# Patient Record
Sex: FEMALE | Race: WHITE | Employment: FULL TIME | URBAN - METROPOLITAN AREA
[De-identification: names, ages, dates, MRNs, and addresses within clinical notes are randomized per-mention and may not be internally consistent; named-entity substitution may affect disease eponyms.]

---

## 2018-09-30 ENCOUNTER — HOSPITAL ENCOUNTER (EMERGENCY)
Age: 53
Discharge: HOME | End: 2018-09-30
Attending: EMERGENCY MEDICINE
Payer: COMMERCIAL

## 2018-09-30 VITALS
BODY MASS INDEX: 38.83 KG/M2 | RESPIRATION RATE: 16 BRPM | SYSTOLIC BLOOD PRESSURE: 160 MMHG | TEMPERATURE: 97.9 F | HEART RATE: 65 BPM | HEIGHT: 62 IN | OXYGEN SATURATION: 95 % | WEIGHT: 211 LBS | DIASTOLIC BLOOD PRESSURE: 121 MMHG

## 2018-09-30 DIAGNOSIS — G43.109 MIGRAINE WITH AURA AND WITHOUT STATUS MIGRAINOSUS, NOT INTRACTABLE: Primary | ICD-10-CM

## 2018-09-30 LAB
ANION GAP SERPL CALCULATED.3IONS-SCNC: 13 MMOL/L (ref 7–16)
BASOPHILS ABSOLUTE: 0.04 E9/L (ref 0–0.2)
BASOPHILS RELATIVE PERCENT: 0.4 % (ref 0–2)
BUN BLDV-MCNC: 13 MG/DL (ref 6–20)
C-REACTIVE PROTEIN: 2.9 MG/DL (ref 0–0.4)
CALCIUM SERPL-MCNC: 9 MG/DL (ref 8.6–10.2)
CHLORIDE BLD-SCNC: 102 MMOL/L (ref 98–107)
CO2: 26 MMOL/L (ref 22–29)
CREAT SERPL-MCNC: 0.8 MG/DL (ref 0.5–1)
EOSINOPHILS ABSOLUTE: 0.16 E9/L (ref 0.05–0.5)
EOSINOPHILS RELATIVE PERCENT: 1.6 % (ref 0–6)
GFR AFRICAN AMERICAN: >60
GFR NON-AFRICAN AMERICAN: >60 ML/MIN/1.73
GLUCOSE BLD-MCNC: 134 MG/DL (ref 74–109)
HCT VFR BLD CALC: 44.5 % (ref 34–48)
HEMOGLOBIN: 13.9 G/DL (ref 11.5–15.5)
IMMATURE GRANULOCYTES #: 0.05 E9/L
IMMATURE GRANULOCYTES %: 0.5 % (ref 0–5)
LYMPHOCYTES ABSOLUTE: 2.02 E9/L (ref 1.5–4)
LYMPHOCYTES RELATIVE PERCENT: 20.5 % (ref 20–42)
MCH RBC QN AUTO: 28.1 PG (ref 26–35)
MCHC RBC AUTO-ENTMCNC: 31.2 % (ref 32–34.5)
MCV RBC AUTO: 89.9 FL (ref 80–99.9)
MONOCYTES ABSOLUTE: 0.46 E9/L (ref 0.1–0.95)
MONOCYTES RELATIVE PERCENT: 4.7 % (ref 2–12)
NEUTROPHILS ABSOLUTE: 7.11 E9/L (ref 1.8–7.3)
NEUTROPHILS RELATIVE PERCENT: 72.3 % (ref 43–80)
PDW BLD-RTO: 13.4 FL (ref 11.5–15)
PLATELET # BLD: 298 E9/L (ref 130–450)
PMV BLD AUTO: 10.1 FL (ref 7–12)
POTASSIUM SERPL-SCNC: 4.1 MMOL/L (ref 3.5–5)
RBC # BLD: 4.95 E12/L (ref 3.5–5.5)
SEDIMENTATION RATE, ERYTHROCYTE: 17 MM/HR (ref 0–20)
SODIUM BLD-SCNC: 141 MMOL/L (ref 132–146)
WBC # BLD: 9.8 E9/L (ref 4.5–11.5)

## 2018-09-30 PROCEDURE — 96375 TX/PRO/DX INJ NEW DRUG ADDON: CPT

## 2018-09-30 PROCEDURE — 96374 THER/PROPH/DIAG INJ IV PUSH: CPT

## 2018-09-30 PROCEDURE — 6370000000 HC RX 637 (ALT 250 FOR IP): Performed by: STUDENT IN AN ORGANIZED HEALTH CARE EDUCATION/TRAINING PROGRAM

## 2018-09-30 PROCEDURE — 86140 C-REACTIVE PROTEIN: CPT

## 2018-09-30 PROCEDURE — 99284 EMERGENCY DEPT VISIT MOD MDM: CPT

## 2018-09-30 PROCEDURE — 80048 BASIC METABOLIC PNL TOTAL CA: CPT

## 2018-09-30 PROCEDURE — 6360000002 HC RX W HCPCS: Performed by: STUDENT IN AN ORGANIZED HEALTH CARE EDUCATION/TRAINING PROGRAM

## 2018-09-30 PROCEDURE — 36415 COLL VENOUS BLD VENIPUNCTURE: CPT

## 2018-09-30 PROCEDURE — 85025 COMPLETE CBC W/AUTO DIFF WBC: CPT

## 2018-09-30 PROCEDURE — 85651 RBC SED RATE NONAUTOMATED: CPT

## 2018-09-30 RX ORDER — METHYLPREDNISOLONE SODIUM SUCCINATE 125 MG/2ML
125 INJECTION, POWDER, LYOPHILIZED, FOR SOLUTION INTRAMUSCULAR; INTRAVENOUS ONCE
Status: COMPLETED | OUTPATIENT
Start: 2018-09-30 | End: 2018-09-30

## 2018-09-30 RX ORDER — KETOROLAC TROMETHAMINE 15 MG/ML
15 INJECTION, SOLUTION INTRAMUSCULAR; INTRAVENOUS ONCE
Status: COMPLETED | OUTPATIENT
Start: 2018-09-30 | End: 2018-09-30

## 2018-09-30 RX ORDER — METOCLOPRAMIDE HYDROCHLORIDE 5 MG/ML
10 INJECTION INTRAMUSCULAR; INTRAVENOUS ONCE
Status: COMPLETED | OUTPATIENT
Start: 2018-09-30 | End: 2018-09-30

## 2018-09-30 RX ORDER — DIPHENHYDRAMINE HCL 25 MG
50 TABLET ORAL ONCE
Status: COMPLETED | OUTPATIENT
Start: 2018-09-30 | End: 2018-09-30

## 2018-09-30 RX ADMIN — METHYLPREDNISOLONE SODIUM SUCCINATE 125 MG: 125 INJECTION, POWDER, FOR SOLUTION INTRAMUSCULAR; INTRAVENOUS at 05:10

## 2018-09-30 RX ADMIN — METOCLOPRAMIDE 10 MG: 5 INJECTION, SOLUTION INTRAMUSCULAR; INTRAVENOUS at 05:10

## 2018-09-30 RX ADMIN — KETOROLAC TROMETHAMINE 15 MG: 15 INJECTION, SOLUTION INTRAMUSCULAR; INTRAVENOUS at 06:00

## 2018-09-30 RX ADMIN — DIPHENHYDRAMINE HCL 50 MG: 25 TABLET ORAL at 05:10

## 2018-09-30 ASSESSMENT — ENCOUNTER SYMPTOMS
DIARRHEA: 0
CHEST TIGHTNESS: 0
VOMITING: 0
NAUSEA: 0
PHOTOPHOBIA: 0
SORE THROAT: 0
EYE REDNESS: 0
BLOOD IN STOOL: 0
ABDOMINAL DISTENTION: 0
CONSTIPATION: 0
WHEEZING: 0
RHINORRHEA: 0
BACK PAIN: 0
SHORTNESS OF BREATH: 0
SINUS PRESSURE: 0
EYE PAIN: 0
ABDOMINAL PAIN: 0
TROUBLE SWALLOWING: 0
COUGH: 0

## 2018-09-30 ASSESSMENT — PAIN SCALES - GENERAL
PAINLEVEL_OUTOF10: 5
PAINLEVEL_OUTOF10: 10

## 2018-09-30 ASSESSMENT — PAIN DESCRIPTION - FREQUENCY: FREQUENCY: CONTINUOUS

## 2018-09-30 ASSESSMENT — PAIN DESCRIPTION - DESCRIPTORS: DESCRIPTORS: THROBBING

## 2018-09-30 ASSESSMENT — PAIN DESCRIPTION - PROGRESSION: CLINICAL_PROGRESSION: NOT CHANGED

## 2018-09-30 ASSESSMENT — PAIN DESCRIPTION - LOCATION: LOCATION: HEAD

## 2018-09-30 ASSESSMENT — PAIN DESCRIPTION - ONSET: ONSET: ON-GOING

## 2018-09-30 ASSESSMENT — PAIN DESCRIPTION - ORIENTATION: ORIENTATION: RIGHT

## 2018-09-30 ASSESSMENT — PAIN DESCRIPTION - PAIN TYPE: TYPE: ACUTE PAIN

## 2018-09-30 NOTE — ED PROVIDER NOTES
ED ATTENDING NOTE    I saw and examined the patient. I discussed the history and examination with the resident and agree with the plan of care         HPI: Pt presents with HA, started 1.5 hours ago, blurry vision in right eye, pmh of migraines, pt denies f/c, cp, sob, cough, ap, n/v/d. Pt is lying in bed in no acute distress. --------------------------------------------- PAST HISTORY ---------------------------------------------  Past Medical History:  has a past medical history of Goiter; Hypertension; and Thyroid disease. Past Surgical History:  has a past surgical history that includes Appendectomy;  section; Tubal ligation; Breast lumpectomy; and Total Thyroidectomy (12). Social History:  reports that she has been smoking Cigarettes. She has a 20.00 pack-year smoking history. She has never used smokeless tobacco. She reports that she does not drink alcohol or use drugs. Family History: family history is not on file. The patients home medications have been reviewed.     Allergies: Vicodin [hydrocodone-acetaminophen]    ROS: Review HPI for other details  Details in electronic record may supersede details below    Gen: no chills, fever  Eyes: no discharge, no change vision  ENT: no sore throat, no rhinitis  Cardiac: no chest pain, no palpitations  Resp: no sob, no cough  GI: no abd pain, no nausea, vomiting, or diarrhea  : no dysuria, urgency, change in color  MS: no back pain, joint pain, no falls, no trauma   Skin: no rash, no itch  Neuro: no dizziness, (+) headache, no focal paralysis or parasthesia  Psych: no hallucinations, no depression  Heme: no bruising, no bleeding  Other relevant systems reviewed and negative    Physical brief exam: See HPI for other details  Details in electronic record may supersede details below    Vital signs reviewed, please refer to nurses note  Constitutionall: No distress, warm, dry, well nourished, nontoxic  HENT:  NC AT, no deformity, no

## 2018-09-30 NOTE — ED NOTES
Pt presents to ED with c/o headache x 2 hours. Pt reports being woken from sleep with HA. Pt reports pain behind Rt eye. Pt denies vision changes. Pt endorses photo/audio-sensitivity. Pt reports Rt arm tingling. Pt denies numbness/weakness in any extremity. Pt denies CP and SOB. Pt denies [address]. Pt is A&O x 3. Pt placed on cardiac monitor and continuous pulse oximetry. Call light within reach. Bed In lowest position. Both side-rails up. NAD noted. Will continue to monitor.           Sergei Johnson RN  09/30/18 [id number]

## 2021-07-30 ENCOUNTER — HOSPITAL ENCOUNTER (EMERGENCY)
Age: 56
Discharge: HOME | End: 2021-07-30
Attending: EMERGENCY MEDICINE

## 2021-07-30 ENCOUNTER — APPOINTMENT (OUTPATIENT)
Dept: CT IMAGING | Age: 56
End: 2021-07-30

## 2021-07-30 VITALS
WEIGHT: 168 LBS | SYSTOLIC BLOOD PRESSURE: 193 MMHG | DIASTOLIC BLOOD PRESSURE: 110 MMHG | TEMPERATURE: 97.3 F | HEIGHT: 62 IN | BODY MASS INDEX: 30.91 KG/M2 | HEART RATE: 80 BPM

## 2021-07-30 DIAGNOSIS — S05.11XA ORBITAL CONTUSION, RIGHT, INITIAL ENCOUNTER: Primary | ICD-10-CM

## 2021-07-30 PROCEDURE — 70486 CT MAXILLOFACIAL W/O DYE: CPT

## 2021-07-30 PROCEDURE — 70450 CT HEAD/BRAIN W/O DYE: CPT

## 2021-07-30 PROCEDURE — 99284 EMERGENCY DEPT VISIT MOD MDM: CPT

## 2021-07-30 ASSESSMENT — ENCOUNTER SYMPTOMS
VOMITING: 0
NAUSEA: 0
SHORTNESS OF BREATH: 0
EYE REDNESS: 0
ABDOMINAL PAIN: 0
FACIAL SWELLING: 1

## 2021-07-30 ASSESSMENT — PAIN SCALES - GENERAL: PAINLEVEL_OUTOF10: 7

## 2021-07-30 NOTE — ED NOTES
Patient refused visual acuity at this time, states she wants to see the doctor first.      Michael Ocampo RN  07/30/21 [phone]

## 2021-07-30 NOTE — ED NOTES
Ice placed, this happened last night, was struck right temporal/eye area, no loc, no nausea     Brandon Vigil RN  07/30/21 [id number]

## 2021-07-30 NOTE — ED PROVIDER NOTES
Chief complaint: Swelling      HPI:  21, Time: 12:59 PM EDT    HPI               Daniel Brandon is a 60 y.o. female presenting to the ED for eye swelling. The history is obtained from the patient as well as the patient's medical record. The patient is presenting today for right eye swelling. The patient was struck with a foul ball describe her skin last night. Patient did have sudden onset of pain. Is described stabbing, nonradiating. Located on the right eye. Nothing makes it better. Nothing makes it worse. No treatment prior to arrival.  This been constant since onset. The patient was evaluated at the EMTs of this crepitus came but did not receive any treatments. The patient states she woke up this morning and her eye was swollen shut. The patient states that when she was able to see her eye there was no hyphema. She states that her pupil and iris were unremarkable. She was able to see out of her eye normally before it was swollen shut. ROS:   Review of Systems   Constitutional: Negative for chills and fatigue. HENT: Positive for facial swelling. Negative for congestion. Eyes: Negative for redness. Respiratory: Negative for shortness of breath. Cardiovascular: Negative for chest pain. Gastrointestinal: Negative for abdominal pain, nausea and vomiting. Genitourinary: Negative for dysuria. Musculoskeletal: Negative for arthralgias. Skin: Negative for rash. Neurological: Negative for light-headedness. Psychiatric/Behavioral: Negative for confusion. All other systems reviewed and are negative.      --------------------------------------------- PAST HISTORY ---------------------------------------------  Past Medical History:  has a past medical history of Goiter, Hypertension, and Thyroid disease. Past Surgical History:  has a past surgical history that includes Appendectomy;   section; Tubal ligation; Breast lumpectomy; and Total Thyroidectomy (6-21-12). Social History:  reports that she has been smoking cigarettes. She has a 20.00 pack-year smoking history. She has never used smokeless tobacco. She reports that she does not drink alcohol and does not use drugs. Family History: family history is not on file. The patients home medications have been reviewed. Allergies: Vicodin [hydrocodone-acetaminophen]    ---------------------------------------------------PHYSICAL EXAM--------------------------------------  Constitutional/General: Alert and oriented x3, well appearing, non toxic in NAD  Head: Normocephalic and there is right periorbital bruising and ecchymosis present, I is not able to be pried open and evaluated, there is right periorbital edema  Mouth: Oropharynx clear, handling secretions, no trismus  Neck: Supple, full ROM,  Pulmonary: Lungs clear to auscultation bilaterally, no wheezes, rales, or rhonchi. Not in respiratory distress  Cardiovascular:  Regular rate. Regular rhythm. No murmurs  Chest: no chest wall tenderness  Abdomen: Soft. Non tender. Non distended. No rebound, guarding, or rigidity. No pulsatile masses appreciated. Musculoskeletal: Moves all extremities x 4. Warm and well perfused, no clubbing, cyanosis, or edema. Capillary refill <3 seconds  Skin: warm and dry. No rashes. Neurologic: GCS 15, no gross focal neurologic deficits  Psych: Normal Affect    -------------------------------------------------- RESULTS -------------------------------------------------  I have personally reviewed all laboratory and imaging results for this patient. Results are listed below. LABS:  No results found for this visit on 07/30/21. RADIOLOGY:  Interpreted by Radiologist.  [facility]   Final Result   1. No sign of facial fracture. 2. Moderate-marked right periorbital, buccal and Perimandibular  soft tissue   contusion. CT HEAD WO CONTRAST   Final Result   1. No acute intracranial abnormality. There is no acute intracranial   hemorrhage. .      2.  Significant right periorbital soft tissue swelling      3. Suspected nondisplaced fracture of the right zygomatic arch. Please see   the dedicated CT of the maxillofacial region report.                 ------------------------- NURSING NOTES AND VITALS REVIEWED ---------------------------   The nursing notes within the ED encounter and vital signs as below have been reviewed by myself. BP (!) 193/110   Pulse 80   Temp 97.3 °F (36.3 °C) (Infrared)   Ht 5' 2\" (1.575 m)   Wt 168 lb (76.2 kg)   LMP 11/18/2011   BMI 30.73 kg/m²   Oxygen Saturation Interpretation: Normal    The patients available past medical records and past encounters were reviewed. ------------------------------ ED COURSE/MEDICAL DECISION MAKING----------------------  Medications - No data to display          Medical Decision Making:   I, Dr. Vanita Camacho am the primary physician of record. Óscar Pleitez is a 60 y.o. female who presents to the ED for pain. I was unable to open the patient's eye secondary to the profound periorbital edema. The patient did have imaging which was negative for any fracture. Differential diagnosis includes but not limited to, fracture, contusion, intracranial hemorrhage. The patient will be diagnosed with contusion will follow-up outpatient. Re-Evaluations/Consultations:           Patient is in no acute distress. Results of today discussed. The patient be discharged. This patient's ED course included: History, physical examination, reevaluation prior to disposition, labs,       This patient has remained hemodynamically stable during their ED course. Counseling: The emergency provider has spoken with the  and discussed todays results, in addition to providing specific details for the plan of care and counseling regarding the diagnosis and prognosis.   Questionpatients are answered at this time and they are agreeable with the plan.       --------------------------------- IMPRESSION AND DISPOSITION ---------------------------------    IMPRESSION  1. Orbital contusion, right, initial encounter        DISPOSITION  Disposition: Discharge to home  Patient condition is stable        NOTE: This report was transcribed using voice recognition software.  Every effort was made to ensure accuracy; however, inadvertent computerized transcription errors may be present         Petey Ford DO  07/30/21 1504

## 2022-03-13 ENCOUNTER — APPOINTMENT (OUTPATIENT)
Dept: MRI IMAGING | Age: 57
DRG: 065 | End: 2022-03-13

## 2022-03-13 ENCOUNTER — APPOINTMENT (OUTPATIENT)
Dept: ULTRASOUND IMAGING | Age: 57
DRG: 065 | End: 2022-03-13

## 2022-03-13 ENCOUNTER — APPOINTMENT (OUTPATIENT)
Dept: CT IMAGING | Age: 57
DRG: 065 | End: 2022-03-13

## 2022-03-13 ENCOUNTER — APPOINTMENT (OUTPATIENT)
Dept: GENERAL RADIOLOGY | Age: 57
DRG: 065 | End: 2022-03-13

## 2022-03-13 ENCOUNTER — HOSPITAL ENCOUNTER (INPATIENT)
Age: 57
LOS: 1 days | Discharge: HOME | DRG: 065 | End: 2022-03-14
Attending: EMERGENCY MEDICINE | Admitting: INTERNAL MEDICINE

## 2022-03-13 DIAGNOSIS — R29.90 STROKE-LIKE SYMPTOMS: Primary | ICD-10-CM

## 2022-03-13 PROBLEM — I63.9 ACUTE CVA (CEREBROVASCULAR ACCIDENT) (HCC): Status: ACTIVE | Noted: 2022-03-13

## 2022-03-13 LAB
ACETAMINOPHEN LEVEL: <5 MCG/ML (ref 10–30)
ALBUMIN SERPL-MCNC: 4.1 G/DL (ref 3.5–5.2)
ALP BLD-CCNC: 102 U/L (ref 35–104)
ALT SERPL-CCNC: 20 U/L (ref 0–32)
AMPHETAMINE SCREEN, URINE: NOT DETECTED
ANION GAP SERPL CALCULATED.3IONS-SCNC: 13 MMOL/L (ref 7–16)
AST SERPL-CCNC: 17 U/L (ref 0–31)
B.E.: -0.8 MMOL/L (ref -3–3)
BACTERIA: ABNORMAL /HPF
BARBITURATE SCREEN URINE: NOT DETECTED
BASOPHILS ABSOLUTE: 0.05 E9/L (ref 0–0.2)
BASOPHILS RELATIVE PERCENT: 0.6 % (ref 0–2)
BENZODIAZEPINE SCREEN, URINE: NOT DETECTED
BILIRUB SERPL-MCNC: 0.3 MG/DL (ref 0–1.2)
BILIRUBIN URINE: NEGATIVE
BLOOD, URINE: NEGATIVE
BUN BLDV-MCNC: 10 MG/DL (ref 6–20)
CALCIUM SERPL-MCNC: 8.8 MG/DL (ref 8.6–10.2)
CANNABINOID SCREEN URINE: NOT DETECTED
CHLORIDE BLD-SCNC: 103 MMOL/L (ref 98–107)
CLARITY: CLEAR
CO2: 25 MMOL/L (ref 22–29)
COCAINE METABOLITE SCREEN URINE: NOT DETECTED
COHB: 1.2 % (ref 0–1.5)
COLOR: YELLOW
CREAT SERPL-MCNC: 0.7 MG/DL (ref 0.5–1)
CRITICAL: ABNORMAL
D DIMER: 440 NG/ML DDU
DATE ANALYZED: ABNORMAL
DATE OF COLLECTION: ABNORMAL
EKG ATRIAL RATE: 79 BPM
EKG P AXIS: 57 DEGREES
EKG P-R INTERVAL: 136 MS
EKG Q-T INTERVAL: 412 MS
EKG QRS DURATION: 82 MS
EKG QTC CALCULATION (BAZETT): 472 MS
EKG R AXIS: 8 DEGREES
EKG T AXIS: 49 DEGREES
EKG VENTRICULAR RATE: 79 BPM
EOSINOPHILS ABSOLUTE: 0.14 E9/L (ref 0.05–0.5)
EOSINOPHILS RELATIVE PERCENT: 1.6 % (ref 0–6)
EPITHELIAL CELLS, UA: ABNORMAL /HPF
ETHANOL: <10 MG/DL (ref 0–0.08)
FENTANYL SCREEN, URINE: NOT DETECTED
GFR AFRICAN AMERICAN: >60
GFR NON-AFRICAN AMERICAN: >60 ML/MIN/1.73
GLUCOSE BLD-MCNC: 115 MG/DL (ref 74–99)
GLUCOSE URINE: NEGATIVE MG/DL
HCO3: 22.7 MMOL/L (ref 22–26)
HCT VFR BLD CALC: 43.2 % (ref 34–48)
HEMOGLOBIN: 13.7 G/DL (ref 11.5–15.5)
HHB: 3.5 % (ref 0–5)
IMMATURE GRANULOCYTES #: 0.03 E9/L
IMMATURE GRANULOCYTES %: 0.3 % (ref 0–5)
KETONES, URINE: NEGATIVE MG/DL
LAB: ABNORMAL
LEUKOCYTE ESTERASE, URINE: NEGATIVE
LYMPHOCYTES ABSOLUTE: 2.14 E9/L (ref 1.5–4)
LYMPHOCYTES RELATIVE PERCENT: 24.3 % (ref 20–42)
Lab: ABNORMAL
Lab: NORMAL
MCH RBC QN AUTO: 27.6 PG (ref 26–35)
MCHC RBC AUTO-ENTMCNC: 31.7 % (ref 32–34.5)
MCV RBC AUTO: 87.1 FL (ref 80–99.9)
METER GLUCOSE: 104 MG/DL (ref 74–99)
METHADONE SCREEN, URINE: NOT DETECTED
METHB: 0.3 % (ref 0–1.5)
MODE: ABNORMAL
MONOCYTES ABSOLUTE: 0.47 E9/L (ref 0.1–0.95)
MONOCYTES RELATIVE PERCENT: 5.3 % (ref 2–12)
NEUTROPHILS ABSOLUTE: 5.98 E9/L (ref 1.8–7.3)
NEUTROPHILS RELATIVE PERCENT: 67.9 % (ref 43–80)
NITRITE, URINE: NEGATIVE
O2 CONTENT: 19 ML/DL
O2 SATURATION: 96.4 % (ref 92–98.5)
O2HB: 95 % (ref 94–97)
OPERATOR ID: 3
OPIATE SCREEN URINE: NOT DETECTED
OXYCODONE URINE: NOT DETECTED
PATIENT TEMP: 37 C
PCO2: 34.1 MMHG (ref 35–45)
PDW BLD-RTO: 14.9 FL (ref 11.5–15)
PH BLOOD GAS: 7.44 (ref 7.35–7.45)
PH UA: 6 (ref 5–9)
PHENCYCLIDINE SCREEN URINE: NOT DETECTED
PLATELET # BLD: 257 E9/L (ref 130–450)
PMV BLD AUTO: 10 FL (ref 7–12)
PO2: 80.8 MMHG (ref 75–100)
POTASSIUM REFLEX MAGNESIUM: 3.7 MMOL/L (ref 3.5–5)
PROTEIN UA: NEGATIVE MG/DL
RBC # BLD: 4.96 E12/L (ref 3.5–5.5)
RBC UA: ABNORMAL /HPF (ref 0–2)
SALICYLATE, SERUM: <0.3 MG/DL (ref 0–30)
SODIUM BLD-SCNC: 141 MMOL/L (ref 132–146)
SOURCE, BLOOD GAS: ABNORMAL
SPECIFIC GRAVITY UA: <=1.005 (ref 1–1.03)
THB: 14.2 G/DL (ref 11.5–16.5)
TIME ANALYZED: 1135
TOTAL PROTEIN: 7.6 G/DL (ref 6.4–8.3)
TRICYCLIC ANTIDEPRESSANTS SCREEN SERUM: NEGATIVE NG/ML
TROPONIN, HIGH SENSITIVITY: 11 NG/L (ref 0–9)
TSH SERPL DL<=0.05 MIU/L-ACNC: 2.9 UIU/ML (ref 0.27–4.2)
UROBILINOGEN, URINE: 0.2 E.U./DL
WBC # BLD: 8.8 E9/L (ref 4.5–11.5)
WBC UA: ABNORMAL /HPF (ref 0–5)

## 2022-03-13 PROCEDURE — 70498 CT ANGIOGRAPHY NECK: CPT

## 2022-03-13 PROCEDURE — 6360000002 HC RX W HCPCS: Performed by: INTERNAL MEDICINE

## 2022-03-13 PROCEDURE — 80179 DRUG ASSAY SALICYLATE: CPT

## 2022-03-13 PROCEDURE — 85378 FIBRIN DEGRADE SEMIQUANT: CPT

## 2022-03-13 PROCEDURE — 6360000004 HC RX CONTRAST MEDICATION: Performed by: RADIOLOGY

## 2022-03-13 PROCEDURE — 84443 ASSAY THYROID STIM HORMONE: CPT

## 2022-03-13 PROCEDURE — 85025 COMPLETE CBC W/AUTO DIFF WBC: CPT

## 2022-03-13 PROCEDURE — 0042T CT BRAIN PERFUSION: CPT

## 2022-03-13 PROCEDURE — 81001 URINALYSIS AUTO W/SCOPE: CPT

## 2022-03-13 PROCEDURE — 99284 EMERGENCY DEPT VISIT MOD MDM: CPT

## 2022-03-13 PROCEDURE — 6370000000 HC RX 637 (ALT 250 FOR IP): Performed by: EMERGENCY MEDICINE

## 2022-03-13 PROCEDURE — 93005 ELECTROCARDIOGRAM TRACING: CPT | Performed by: EMERGENCY MEDICINE

## 2022-03-13 PROCEDURE — 80143 DRUG ASSAY ACETAMINOPHEN: CPT

## 2022-03-13 PROCEDURE — 84484 ASSAY OF TROPONIN QUANT: CPT

## 2022-03-13 PROCEDURE — 93010 ELECTROCARDIOGRAM REPORT: CPT | Performed by: INTERNAL MEDICINE

## 2022-03-13 PROCEDURE — 93880 EXTRACRANIAL BILAT STUDY: CPT

## 2022-03-13 PROCEDURE — 80307 DRUG TEST PRSMV CHEM ANLYZR: CPT

## 2022-03-13 PROCEDURE — 82077 ASSAY SPEC XCP UR&BREATH IA: CPT

## 2022-03-13 PROCEDURE — 99443 PR PHYS/QHP TELEPHONE EVALUATION 21-30 MIN: CPT | Performed by: PSYCHIATRY & NEUROLOGY

## 2022-03-13 PROCEDURE — 36415 COLL VENOUS BLD VENIPUNCTURE: CPT

## 2022-03-13 PROCEDURE — 2580000003 HC RX 258: Performed by: EMERGENCY MEDICINE

## 2022-03-13 PROCEDURE — 2500000003 HC RX 250 WO HCPCS: Performed by: INTERNAL MEDICINE

## 2022-03-13 PROCEDURE — 70450 CT HEAD/BRAIN W/O DYE: CPT

## 2022-03-13 PROCEDURE — 97161 PT EVAL LOW COMPLEX 20 MIN: CPT | Performed by: PHYSICAL THERAPIST

## 2022-03-13 PROCEDURE — 71045 X-RAY EXAM CHEST 1 VIEW: CPT

## 2022-03-13 PROCEDURE — 1200000000 HC SEMI PRIVATE

## 2022-03-13 PROCEDURE — 82805 BLOOD GASES W/O2 SATURATION: CPT

## 2022-03-13 PROCEDURE — 70496 CT ANGIOGRAPHY HEAD: CPT

## 2022-03-13 PROCEDURE — 6370000000 HC RX 637 (ALT 250 FOR IP): Performed by: INTERNAL MEDICINE

## 2022-03-13 PROCEDURE — 82962 GLUCOSE BLOOD TEST: CPT

## 2022-03-13 PROCEDURE — 80053 COMPREHEN METABOLIC PANEL: CPT

## 2022-03-13 PROCEDURE — 70551 MRI BRAIN STEM W/O DYE: CPT

## 2022-03-13 RX ORDER — CLOPIDOGREL BISULFATE 75 MG/1
75 TABLET ORAL DAILY
Status: DISCONTINUED | OUTPATIENT
Start: 2022-03-13 | End: 2022-03-14 | Stop reason: HOSPADM

## 2022-03-13 RX ORDER — POLYETHYLENE GLYCOL 3350 17 G/17G
17 POWDER, FOR SOLUTION ORAL DAILY PRN
Status: DISCONTINUED | OUTPATIENT
Start: 2022-03-13 | End: 2022-03-14 | Stop reason: HOSPADM

## 2022-03-13 RX ORDER — CLOPIDOGREL 300 MG/1
300 TABLET, FILM COATED ORAL ONCE
Status: COMPLETED | OUTPATIENT
Start: 2022-03-13 | End: 2022-03-13

## 2022-03-13 RX ORDER — ONDANSETRON 4 MG/1
4 TABLET, ORALLY DISINTEGRATING ORAL EVERY 8 HOURS PRN
Status: DISCONTINUED | OUTPATIENT
Start: 2022-03-13 | End: 2022-03-14 | Stop reason: HOSPADM

## 2022-03-13 RX ORDER — MAGNESIUM SULFATE 1 G/100ML
1000 INJECTION INTRAVENOUS PRN
Status: DISCONTINUED | OUTPATIENT
Start: 2022-03-13 | End: 2022-03-14 | Stop reason: HOSPADM

## 2022-03-13 RX ORDER — 0.9 % SODIUM CHLORIDE 0.9 %
1000 INTRAVENOUS SOLUTION INTRAVENOUS ONCE
Status: DISCONTINUED | OUTPATIENT
Start: 2022-03-13 | End: 2022-03-13

## 2022-03-13 RX ORDER — LABETALOL HYDROCHLORIDE 5 MG/ML
10 INJECTION, SOLUTION INTRAVENOUS EVERY 10 MIN PRN
Status: DISCONTINUED | OUTPATIENT
Start: 2022-03-13 | End: 2022-03-14

## 2022-03-13 RX ORDER — LEVOTHYROXINE SODIUM 0.05 MG/1
50 TABLET ORAL DAILY
Status: DISCONTINUED | OUTPATIENT
Start: 2022-03-13 | End: 2022-03-14 | Stop reason: HOSPADM

## 2022-03-13 RX ORDER — POTASSIUM CHLORIDE 7.45 MG/ML
10 INJECTION INTRAVENOUS PRN
Status: DISCONTINUED | OUTPATIENT
Start: 2022-03-13 | End: 2022-03-14 | Stop reason: HOSPADM

## 2022-03-13 RX ORDER — LISINOPRIL 10 MG/1
10 TABLET ORAL DAILY
Status: DISCONTINUED | OUTPATIENT
Start: 2022-03-13 | End: 2022-03-14 | Stop reason: HOSPADM

## 2022-03-13 RX ORDER — POTASSIUM CHLORIDE 20 MEQ/1
40 TABLET, EXTENDED RELEASE ORAL PRN
Status: DISCONTINUED | OUTPATIENT
Start: 2022-03-13 | End: 2022-03-14 | Stop reason: HOSPADM

## 2022-03-13 RX ORDER — ASPIRIN 81 MG/1
324 TABLET, CHEWABLE ORAL ONCE
Status: COMPLETED | OUTPATIENT
Start: 2022-03-13 | End: 2022-03-13

## 2022-03-13 RX ORDER — ATORVASTATIN CALCIUM 40 MG/1
80 TABLET, FILM COATED ORAL NIGHTLY
Status: DISCONTINUED | OUTPATIENT
Start: 2022-03-13 | End: 2022-03-14 | Stop reason: HOSPADM

## 2022-03-13 RX ORDER — ONDANSETRON 2 MG/ML
4 INJECTION INTRAMUSCULAR; INTRAVENOUS EVERY 6 HOURS PRN
Status: DISCONTINUED | OUTPATIENT
Start: 2022-03-13 | End: 2022-03-14 | Stop reason: HOSPADM

## 2022-03-13 RX ORDER — 0.9 % SODIUM CHLORIDE 0.9 %
1000 INTRAVENOUS SOLUTION INTRAVENOUS ONCE
Status: COMPLETED | OUTPATIENT
Start: 2022-03-13 | End: 2022-03-13

## 2022-03-13 RX ADMIN — ASPIRIN 81 MG 324 MG: 81 TABLET ORAL at 15:34

## 2022-03-13 RX ADMIN — CLOPIDOGREL BISULFATE 300 MG: 300 TABLET, FILM COATED ORAL at 17:17

## 2022-03-13 RX ADMIN — IOPAMIDOL 140 ML: 755 INJECTION, SOLUTION INTRAVENOUS at 12:34

## 2022-03-13 RX ADMIN — ENOXAPARIN SODIUM 40 MG: 100 INJECTION SUBCUTANEOUS at 15:34

## 2022-03-13 RX ADMIN — ATORVASTATIN CALCIUM 80 MG: 40 TABLET, FILM COATED ORAL at 21:31

## 2022-03-13 RX ADMIN — LABETALOL HYDROCHLORIDE 10 MG: 5 INJECTION INTRAVENOUS at 15:36

## 2022-03-13 RX ADMIN — SODIUM CHLORIDE 1000 ML: 9 INJECTION, SOLUTION INTRAVENOUS at 11:37

## 2022-03-13 ASSESSMENT — ENCOUNTER SYMPTOMS
BACK PAIN: 0
SHORTNESS OF BREATH: 0
WHEEZING: 0
DIARRHEA: 0
ABDOMINAL PAIN: 0
EYE PAIN: 0
NAUSEA: 0
VOMITING: 0
COUGH: 0
SORE THROAT: 0

## 2022-03-13 ASSESSMENT — PAIN SCALES - GENERAL
PAINLEVEL_OUTOF10: 0
PAINLEVEL_OUTOF10: 0

## 2022-03-13 NOTE — H&P
Department of Internal Medicine  History and Physical Examination     Primary Care Physician: Ike Guillen DO   Admitting Physician:  Maame Batres DO  Admission date and time: 3/13/2022 10:58 AM    Room:  06/06  Admitting diagnosis: Acute CVA (cerebrovascular accident) [facility]) [I63.9]    Patient Name: Ty Vila  MRN: [id number]    Date of Service: 3/13/2022     Chief Complaint: Altered mental status and slurred speech    HISTORY OF PRESENT ILLNESS:    Cinthia 40-year-old female patient who presented to [address] [place] [address] emergency department concern of stroke. She developed some slurred speech, altered mentation and weakness. Onset of symptoms was between 8 and 8:30 AM.  ER presentation revealed resolution of symptoms upon arrival.  NIH at that time scored 0. CT scan showed remote appearing infarct but nothing acute. She was markedly hypertensive on presentation as well. Around noon, the patient's symptoms recurred and she was found to be significantly less hypertensive. CTAs were ordered and have been performed but are not yet resulted. Extensive work-up was undertaken including urinalysis with urine drug screen returned negative, serum drug screen and alcohol that returned negative, CBC that was unremarkable, D-dimer that was only mildly elevated at 440, developmental that revealed very mild hyperglycemia but was otherwise unremarkable. Hepatic function panel was also assessed and negative. TSH was normal.  High-sensitivity troponin was not significantly elevated. Case was discussed with the internal medicine physician and the ER physician, all parties are agreeable with plan for admission at this time. Patient was seen and assessed at bedside following CTA completion. She is again having some slurred speech and dysarthria as well as left upper extremity weakness. She denies headache. She denies any known history of stroke. She denies any recent injury, falls or trauma.   She denies any use of intoxicating substances or illicit drugs. She denies any additional neurological complaints. She denies chest pain or palpitations. She denies shortness of breath, cough or URI complaints. She denies abdominal pain, nausea vomiting, bowel changes, hematochezia or melena. Denies acute urinary complaints. I have discussed the case with the ER physician resident as they have updated the neurologist involved in the case. Recommendations were for permissive hypertension as the patient's change in status was associated with a significant decrease in blood pressure. Stat MRI has been arranged and the patient is for MRI at this immediate time. PAST MEDICAL Hx:  Past Medical History:   Diagnosis Date    Goiter     Hypertension     Thyroid disease        PAST SURGICAL Hx:   Past Surgical History:   Procedure Laterality Date    APPENDECTOMY      BREAST LUMPECTOMY      right,benign     SECTION      TOTAL THYROIDECTOMY  10    sub total thyroidectomy    TUBAL LIGATION         FAMILY Hx:  History reviewed. No pertinent family history. HOME MEDICATIONS:  Prior to Admission medications    Medication Sig Start Date End Date Taking? Authorizing Provider   lisinopril (PRINIVIL;ZESTRIL) 10 MG tablet Take 10 mg by mouth daily    Historical Provider, MD   Ascorbic Acid (VITAMIN C) 500 MG tablet Take 500 mg by mouth daily    Historical Provider, MD   vitamin D (CHOLECALCIFEROL) 1000 UNIT TABS tablet Take 1,000 Units by mouth daily    Historical Provider, MD   vitamin B-12 (CYANOCOBALAMIN) 100 MCG tablet Take 50 mcg by mouth daily    Historical Provider, MD   meclizine (ANTIVERT) 12.5 MG tablet Take 1-2 PO Q 8 hrs PRN dizziness. 3/18/16   Abundio Mustafa DO   ibuprofen (ADVIL;MOTRIN) 600 MG tablet Take 1 tablet by mouth every 6 hours as needed for Pain Take WITH Food / Meals.  3/18/16   Abundio Mustafa DO   levothyroxine (SYNTHROID) 50 MCG tablet Take 50 mcg by mouth Daily    Historical Provider, MD metoprolol (LOPRESSOR) 25 MG tablet Take 25 mg by mouth 2 times daily. Historical Provider, MD       ALLERGIES:  Vicodin [hydrocodone-acetaminophen]    SOCIAL Hx:  Social History     Socioeconomic History    Marital status:      Spouse name: Not on file    Number of children: Not on file    Years of education: Not on file    Highest education level: Not on file   Occupational History    Not on file   Tobacco Use    Smoking status: Current Every Day Smoker     Packs/day: 1.00     Years: 20.00     Pack years: 20.00     Types: Cigarettes    Smokeless tobacco: Never Used   Substance and Sexual Activity    Alcohol use: No    Drug use: No    Sexual activity: Not on file   Other Topics Concern    Not on file   Social History Narrative    Not on file     Social Determinants of Health     Financial Resource Strain:     Difficulty of Paying Living Expenses: Not on file   Food Insecurity:     Worried About Running Out of Food in the Last Year: Not on file    Wilfredo of Food in the Last Year: Not on file   Transportation Needs:     Lack of Transportation (Medical): Not on file    Lack of Transportation (Non-Medical):  Not on file   Physical Activity:     Days of Exercise per Week: Not on file    Minutes of Exercise per Session: Not on file   Stress:     Feeling of Stress : Not on file   Social Connections:     Frequency of Communication with Friends and Family: Not on file    Frequency of Social Gatherings with Friends and Family: Not on file    Attends Church Services: Not on file    Active Member of Clubs or Organizations: Not on file    Attends Club or Organization Meetings: Not on file    Marital Status: Not on file   Intimate Partner Violence:     Fear of Current or Ex-Partner: Not on file    Emotionally Abused: Not on file    Physically Abused: Not on file    Sexually Abused: Not on file   Housing Stability:     Unable to Pay for Housing in the Last Year: Not on file    Number of Places Lived in the Last Year: Not on file    Unstable Housing in the Last Year: Not on file       ROS: 12 point review of symptoms was conducted, pertinent positives and negative were reviewed, aside from that all 12 systems were reviewed and negative. PHYSICAL EXAM:  VITALS:  Vitals:    03/13/22 1150   BP: (!) 167/104   Pulse: 84   Resp: 30   Temp:    SpO2: 98%         CONSTITUTIONAL:    Awake, alert, cooperative, comfortable without distress    EYES:    PERRL, EOMI, sclera clear without icterus, conjunctiva normal    ENT:    Normocephalic, atraumatic, sinuses nontender on palpation. External ears without lesions. Oral pharynx with moist mucus membranes. NECK:    Supple, symmetrical, trachea midline, no adenopathy, thyroid symmetric, not enlarged and no tenderness, skin normal, no bruits, no JVD    HEMATOLOGIC/LYMPHATICS:    No cervical lymphadenopathy and no supraclavicular lymphadenopathy    LUNGS:    Symmetric. No increased work of breathing, diminished air exchange, clear to auscultation bilaterally, no wheezes, rhonchi, or rales,     CARDIOVASCULAR:    Normal apical impulse, regular rate and rhythm, normal S1 and S2, systolic murmur noted    ABDOMEN:    Mildly obese contour, normal bowel sounds, soft, non-distended, non-tender, no masses palpated, no hepatosplenomegaly, no rebound or guarding elicited on palpation     MUSCULOSKELETAL:    There is no redness, warmth, or swelling of the joints. Tone is normal.    NEUROLOGIC:    Awake, alert, oriented to name, place and time. Dysarthria is appreciated and is very mild. Mild left facial asymmetry appreciated, mild strength changes in the left upper extremity will compared to the right, otherwise unremarkable neurological examination. Otherwise, cranial nerves II-XII are grossly intact.     NIH Stroke Scale/Score at time of initial evaluation:  1A: Level of Consciousness 0 - alert; keenly responsive   1B: Ask Month and Age 0 - answers both questions correctly   1C: Tell Patient To Open and Close Eyes, then Hand  Squeeze 0 - performs both tasks correctly   2: Test Horizontal Extraocular Movements 0 - normal   3: Test Visual Fields 0 - no visual loss   4: Test Facial Palsy 1 - minor paralysis (flattened nasolabial fold, asymmetric on smiling)   5A: Test Left Arm Motor Drift 1 - drift, limb holds 90 (or 45) degrees but drifts down before full 10 seconds: does not hit bed   5B: Test Right Arm Motor Drift 0 - no drift, limb holds 90 (or 45) degrees for full 10 seconds   6A: Test Left Leg Motor Drift 0 - no drift; leg holds 30 degree position for full 5 seconds   6B: Test Right Leg Motor Drift 0 - no drift; leg holds 30 degree position for full 5 seconds   7: Test Limb Ataxia   (FNF/Heel-Shin) 0 - absent   8: Test Sensation 0 - normal; no sensory loss   9: Test Language/Aphasia 0 - no aphasia, normal   10: Test Dysarthria 1 - mild to moderate, patient slurs at least some words and at worst, can be understood with some difficulty   11: Test Extinction/Inattention 0 - no abnormality   Total Score: 3   3/13/22 at 12:15pm.      SKIN:    No bruising or bleeding. No redness, warmth, or swelling    EXTREMITIES:    Peripheral pulses present. No edema, cyanosis, or swelling.     LABORATORY DATA:  CBC with Differential:    Lab Results   Component Value Date    WBC 8.8 03/13/2022    RBC 4.96 03/13/2022    HGB 13.7 03/13/2022    HCT 43.2 03/13/2022     03/13/2022    MCV 87.1 03/13/2022    MCH 27.6 03/13/2022    MCHC 31.7 03/13/2022    RDW 14.9 03/13/2022    SEGSPCT 61 10/10/2011    LYMPHOPCT 24.3 03/13/2022    MONOPCT 5.3 03/13/2022    BASOPCT 0.6 03/13/2022    MONOSABS 0.47 03/13/2022    LYMPHSABS 2.14 03/13/2022    EOSABS 0.14 03/13/2022    BASOSABS 0.05 03/13/2022     CMP:    Lab Results   Component Value Date     03/13/2022    K 3.7 03/13/2022     03/13/2022    CO2 25 03/13/2022    BUN 10 03/13/2022    CREATININE 0.7 03/13/2022    GFRAA >60 03/13/2022    LABGLOM >60 03/13/2022    GLUCOSE 115 03/13/2022    GLUCOSE 116 01/18/2012    PROT 7.6 03/13/2022    LABALBU 4.1 03/13/2022    LABALBU 4.6 10/10/2011    CALCIUM 8.8 03/13/2022    BILITOT 0.3 03/13/2022    ALKPHOS 102 03/13/2022    AST 17 03/13/2022    ALT 20 03/13/2022     Recent Labs     03/13/22  1106   TROPHS 11*       ASSESSMENT:  · Acute CVA with diagnostic evidence of cerebrovascular disease and prior CVA  · Hypertensive emergency with underlying essential hypertension  · Hypothyroidism  · Obesity with BMI 31.74 kg/m²    PLAN:  Nuala Hamman was admitted from the ER due to acute CVA with diagnostic evidence of cerebrovascular disease and prior remote appearing infarcts as well. She had marked hypertension on presentation with systolic blood pressures greater than 200 but was asymptomatic on arrival.  She had return of symptoms associated with significant reduction in blood pressure without pharmacologic agents utilized. CTAs were obtained and are pending. The neurology service was updated by the ER team with recommendation for permissive hypertension as her return if symptoms were associated with significant decrease in blood pressure. I have placed order for stat MRI and the patient is being transported via gurney to MRI at this present time. Carotid ultrasound will be obtained unless CTA of the neck has been already obtained by the ER team.  Echocardiogram with bubble study will be obtained to exclude cardioembolic phenomenon. I will hold the patient's antihypertensives at this time to allow for permissive hypertension. Statin and dual antiplatelet therapy has been added. PT, OT and speech will provide consultation as well social work for discharge planning purposes. Underlying co-morbidites will be addressed during hospitalization as well. Labs and vital signs will be monitored closely and addressed accordingly. See additional orders for details.      Stevie Heredia, APRN - CNP  12:27 PM  3/13/2022    Electronically signed by Anastacio Cheadle, APRN - CNP on 3/13/22 at 12:27 PM EDT

## 2022-03-13 NOTE — VIRTUAL HEALTH
[address] [facility],4Th Floor Stroke and Vascular Neurology Consult for  [facility] Stroke Alert through [address] @ 1218pm 3/13/2022  3/13/2022 12:34 PM    Pt Name: Brenda Onofre  MRN: [id number]  YOB: 1965  Date of evaluation: 3/13/2022  Primary Care Physician: Maritza Leon DO  Reason for Evaluation: Stroke evaluation with Phone Consult, Discussion and Review of imaging    59yo female with pmh of htn, hypothyroid. Pt presents with stuttering TIA L droop, dysarthria, LUE weakness. Symptoms concerning for R lacunar stroke in setting of no severe stenosis. CT/A/P neg for acute changes. At baseline pt has no focal symptoms. Pt went to be 1AM 3/13/2022 with no symptoms. The next am pt was on the phone with daughter who noted slurred speech. 10min later symptoms resolved. Pt had another episode of transient L face droop and slurred speech, and a second episode of transient L face droop, slurred speech, and LUE drift. Pt currently at baseline. sbp as high as 209 in the ed. Allergies  is allergic to vicodin [hydrocodone-acetaminophen]. Medications  Prior to Admission medications    Medication Sig Start Date End Date Taking? Authorizing Provider   lisinopril (PRINIVIL;ZESTRIL) 10 MG tablet Take 10 mg by mouth daily    Historical Provider, MD   Ascorbic Acid (VITAMIN C) 500 MG tablet Take 500 mg by mouth daily    Historical Provider, MD   vitamin D (CHOLECALCIFEROL) 1000 UNIT TABS tablet Take 1,000 Units by mouth daily    Historical Provider, MD   vitamin B-12 (CYANOCOBALAMIN) 100 MCG tablet Take 50 mcg by mouth daily    Historical Provider, MD   meclizine (ANTIVERT) 12.5 MG tablet Take 1-2 PO Q 8 hrs PRN dizziness. 3/18/16   Abundio Mustafa DO   ibuprofen (ADVIL;MOTRIN) 600 MG tablet Take 1 tablet by mouth every 6 hours as needed for Pain Take WITH Food / Meals.  3/18/16   Abundio Mustafa DO   levothyroxine (SYNTHROID) 50 MCG tablet Take 50 mcg by mouth Daily    Historical Provider, MD   metoprolol (LOPRESSOR) 25 MG tablet Take 25 mg by mouth 2 times daily. Historical Provider, MD    Scheduled Meds:   sodium chloride  1,000 mL IntraVENous Once    levothyroxine  50 mcg Oral Daily    [Held by provider] lisinopril  10 mg Oral Daily    [Held by provider] metoprolol tartrate  25 mg Oral BID    enoxaparin  40 mg SubCUTAneous Daily    atorvastatin  80 mg Oral Nightly    clopidogrel  75 mg Oral Daily    sodium chloride  1,000 mL IntraVENous Once    clopidogrel  300 mg Oral Once    aspirin  324 mg Oral Once     Continuous Infusions:  PRN Meds:.perflutren lipid microspheres, magnesium sulfate, potassium chloride **OR** potassium alternative oral replacement **OR** potassium chloride, ondansetron **OR** ondansetron, polyethylene glycol, labetalol  Past Medical History   has a past medical history of Goiter, Hypertension, and Thyroid disease. Social History  Social History     Socioeconomic History    Marital status:      Spouse name: Not on file    Number of children: Not on file    Years of education: Not on file    Highest education level: Not on file   Occupational History    Not on file   Tobacco Use    Smoking status: Current Every Day Smoker     Packs/day: 1.00     Years: 20.00     Pack years: 20.00     Types: Cigarettes    Smokeless tobacco: Never Used   Substance and Sexual Activity    Alcohol use: No    Drug use: No    Sexual activity: Not on file   Other Topics Concern    Not on file   Social History Narrative    Not on file     Social Determinants of Health     Financial Resource Strain:     Difficulty of Paying Living Expenses: Not on file   Food Insecurity:     Worried About Running Out of Food in the Last Year: Not on file    Wilfredo of Food in the Last Year: Not on file   Transportation Needs:     Lack of Transportation (Medical): Not on file    Lack of Transportation (Non-Medical):  Not on file   Physical Activity:     Days of Exercise per Week: Not on file    MRS: 0      Imaging:  Images were personally reviewed with DI. AI used to review images including:  CT brain without contrast: no large territory hypodensity or bleed  CTA imaging: no LVO. Moderate diffuse IC athero, b/l IC ICA and R P2  CTp: no perfusion defiict    Assessment  59yo female with pmh of htn, hypothyroid. Pt presents with stuttering TIA L droop, dysarthria, LUE weakness. Symptoms concerning for R lacunar stroke in setting of no severe stenosis on cta. CT/A/P neg for acute changes. Stroke etio ic athero vs small vessel disease. Recommendations:  --no tpa, pt at baseline  --no mt, no lvo  --admit to [facility], no txf. Admit to stepdown. --sbp 120-220  --bolus 1L NS, continue 100cc/hr (hold if bp exceeds target)  --load asa 325 and plavix 300, continue asa 81 and plavix 75 daily. --stroke care and workup as per neuro. Discussed with ED Physician    At least 30 min of Telemedicine and time in conversation directly with ED staff and physician for the patient who is in imminent and life threatening deterioration without further treatment and evaluation. This Virtual Visit was conducted with patient's (and/or legal guardian's) consent, to provide telestroke consultation and necessary medical care. Time spent examining patient, reviewing the images personally, reviewing the chart, perform high complexity decision making and speaking with the nursing staff regarding recommendations    This is a Phone Consult, I have not seen the patient face to face, the telemedicine device was not utilized.     Nica Frye MD, PhD  Stroke, Neurocritical Care And/or [address] [place] [address] Stroke Network  [id number] [place]  Electronically signed 3/13/2022 at 12:34 PM

## 2022-03-13 NOTE — PROGRESS NOTES
Physical Therapy  Physical Therapy Initial Evaluation/Plan of Care    Room #:  0423/0423-02  Patient Name: Abimael Sanchez  YOB: 1965  MRN: [id number]    Date of Service: 3/13/2022     Tentative placement recommendation: Home Health Physical Therapy   Equipment recommendation: To be determined      Evaluating Physical Therapist: Ozzie Graham Physical Therapist      Specific Provider Orders/Date/Referring Provider : 03/13/22 1230   PT evaluation and treat Start: 03/13/22 1230, End: 03/13/22 1230, ONE TIME, Standing Count: 1 Occurrences, R    Edwardo Crowder DO      Admitting Diagnosis:   Acute CVA (cerebrovascular accident) [facility]) [I63.9]      Surgery: none   admitted with left sided weakness    Patient Active Problem List   Diagnosis    Multinodular goiter    Hyperthyroidism    Dysphagia    Acute CVA (cerebrovascular accident) ([facility] [address]        ASSESSMENT of Current Deficits Patient exhibits decreased strength, balance and endurance impairing functional mobility, gait  and gait distance. Patient presented to the emergency room with left sided weakness, and  left sided facial droop earlier in the day. Throughout therapy session, patient exhibited minimal coordination deficits relating to speed and accuracy of movement on left side in comparison to right. Patient demonstrated minimal deficits relating to weakness on left side comparison to right. Patient did not exhibit deficits with sensation or proprioception. Patient demonstrated slight unsteadiness with ambulation, and standing balance in regards to reaching outside base of support. Current medical complications, and slight unsteadiness with ambulation are barriers to d/c and require skilled intervention during hospital stay to attain pre hospital level of function. Decreased strength, balance and endurance  increases patient's risk for fall.        PHYSICAL THERAPY  PLAN OF CARE       Physical therapy plan of care is established based on physician order,  patient diagnosis and clinical assessment    Current Treatment Recommendations:    -Standing Balance: Perform strengthening exercises in standing to promote motor control with or without upper extremity support  and Perform sit to stand activities maintaining FWB (full weight bearing) weightbearing left lower extremity   -Transfers: Provide instruction on proper hand and foot position for adequate transfer of weight onto lower extremities and use of gait device if needed and Cues for hand placement, technique and safety. Provide stabilization to prevent fall   -Gait: Gait training, Standing activities to improve: base of support, weight shift, weight bearing  and Exercises to improve trunk control   -Endurance: Utilize Supervised activities to increase level of endurance to allow for safe functional mobility including transfers and gait   -Stairs: Stair training with instruction on proper technique and hand placement on rail to maintain FWB (full weight bearing) weightbearing Bilateral     PT long term treatment goals are located in below grid    Patient and or family understand(s) diagnosis, prognosis, and plan of care. Frequency of treatments: Patient will be seen  daily.          Prior Level of Function: Patient ambulated independently   Rehab Potential: good for baseline    Past medical history:   Past Medical History:   Diagnosis Date    Goiter     Hypertension     Thyroid disease      Past Surgical History:   Procedure Laterality Date    APPENDECTOMY      BREAST LUMPECTOMY      right,benign     SECTION      TOTAL THYROIDECTOMY  10    sub total thyroidectomy    TUBAL LIGATION         SUBJECTIVE:    Precautions: Up as tolerated, NPO, Acute CVA affecting left side  Social history: Patient lives with boyfriend in a ranch home  with 2 steps  to enter without Rail  Tub shower     Equipment owned: None    [address]   How much difficulty turning over in bed?: A Little  How much difficulty sitting down on / standing up from a chair with arms?: A Little  How much difficulty moving from lying on back to sitting on side of bed?: A Little  How much help from another person moving to and from a bed to a chair?: A Little  How much help from another person needed to walk in hospital room?: A Little  How much help from another person for climbing 3-5 steps with a railing?: A Little  AM-[facility] Inpatient Mobility Raw Score : 18  AM-PAC Inpatient T-Scale Score : 43.63  Mobility Inpatient CMS 0-100% Score: 46.58  Mobility Inpatient CMS G-Code Modifier : CK    Nursing cleared patient for PT evaluation. The admitting diagnosis and active problem list as listed above have been reviewed prior to the initiation of this evaluation. OBJECTIVE;   Initial Evaluation  Date: 3/13/2022 Treatment Date:     Short Term/ Long Term   Goals   Was pt agreeable to Eval/treatment? Yes  To be met in 4 days   Pain level   0/10       Bed Mobility    Rolling: Supervision     Supine to sit: Supervision     Sit to supine: Supervision     Scooting: Supervision     Rolling: Independent    Supine to sit:  Independent    Sit to supine: Independent    Scooting: Supervision      Transfers Sit to stand: Supervision    Sit to stand: Independent    Ambulation    75 feet using  no device with Minimal assist of 1   cues for sequencing, upright posture and safety   150 feet using  no device with Independent    Stair negotiation: ascended and descended   Not assessed     2 steps no rail independent   ROM Within functional limits    Increase range of motion 10% of affected joints    Strength BUE:  4+/5 RUE, 4/5 LUE  RLE:  4+/5  LLE:  4/5  Increase strength in affected mm groups by 1/3 grade   Balance Sitting EOB:  good -  Dynamic Standing:  fair +  Sitting EOB:  good   Dynamic Standing: good      Patient is Alert & Oriented x person, place, time and situation and follows directions Sensation:  Patient  denies numbness/tingling   Edema:  no   Endurance: fair  +    Vitals: room air   Blood Pressure at rest  Blood Pressure during session    Heart Rate at rest 86 Heart Rate during session    SPO2 at rest 93%  SPO2 during session 93-96%     Patient education  Patient educated on role of Physical Therapy, risks of immobility, safety and plan of care, energy conservation,  importance of mobility while in hospital , safety  and stair training      Patient response to education:   Pt verbalized understanding Pt demonstrated skill Pt requires further education in this area   Yes Partial Yes      Treatment:  Patient practiced and was instructed/facilitated in the following treatment: Patient ambulated in hallway with challenges for following directions, as well as head turning for balance challenges. Patient performed rapid alternating movements, finger to nose testing, etc.  Patient performed standing balance challenges, as well as challenges for proprioception. Therapeutic Exercises:  Reaching outside base of support, sitting balance challenges, rapid alternating movements  x 5 reps. At end of session, patient in bed with spouse present call light and phone within reach,  all lines and tubes intact, nursing notified. Patient would benefit from continued skilled Physical Therapy to improve functional independence and quality of life. Patient's/ family goals   home    Time in  65  Time out  255    Total Treatment Time  0 minutes    Evaluation time includes thorough review of current medical information, gathering information on past medical history/social history and prior level of function, completion of standardized testing/informal observation of tasks, assessment of data, and development of Plan of care and goals.      CPT codes:  Low Complexity PT evaluation (62865)  No treatment billed    Loren De La Garza, Student Physical Therapist

## 2022-03-13 NOTE — ED NOTES
Dr. Paz Orellana and resident to room d/t pt left sided weakness and facial droop.       Tonya Wylie RN  03/13/22 4040

## 2022-03-13 NOTE — ED PROVIDER NOTES
HPI   Patient is a 60 y.o. female with a past medical history of hypothyroidism, hypertension, presenting to the Emergency Department for altered mental status. History obtained by patient. Symptoms are moderate in severity and intermittent, improved since onset. They are improved by time and worsened by time. Patient presents for altered mental status and slurring of speech. History obtained by patient as well as patient's daughter on the phone. Patient worked all day yesterday. She went in around 8:30 in the morning. She is a home health nurse. She did not go home last night and worked overnight and just left at work. She states she went to bed around 1:00. She woke up this morning around 8 AM.  She states that she felt a little off this morning but could not put her finger on it. She states she felt very sweaty. Her daughter called her on 56. Patient's daughter stated that patient was slurring her speech and confused on the phone. She states her mother said that she felt very hot and she thought her mother sounded like she was having difficulty breathing. She hung up the phone and called her stepdad to go pick her up and bring her to the hospital.  She states she called her mom back around 950 and symptoms seem to have resolved. She states she is not slurring her speech. She is not really confused and felt somewhat better. She had up the phone to call her step day back and then she called her mother back a few minutes later. She states when she called her mother back again she seemed to be confused again as well as she thought she was slurring her words. She states she also sounds like she is having difficulty breathing again. Last known complete normal was last night around 1 AM before she went to bed. Patient states she has felt tired all day today. She states she did not feel like she was confused earlier. Daughter states the patient did tell her that she urinated on herself. Patient tells me she does not remember this at this time. Patient denies any focal weakness. Denies chest pain or shortness of breath. Denies leg pain or leg swelling. Nothing like this happen before. Patient states she did take her blood pressure medication today. Family member with patient in room currently does not think she is slurring her words. He states she was intermittently doing earlier. He also denies seeing a facial droop. Review of Systems   Constitutional: Positive for diaphoresis and fatigue. Negative for chills and fever. HENT: Negative for congestion and sore throat. Eyes: Negative for pain and visual disturbance. Respiratory: Negative for cough, shortness of breath and wheezing. Cardiovascular: Negative for chest pain. Gastrointestinal: Negative for abdominal pain, diarrhea, nausea and vomiting. Genitourinary: Negative for dysuria and frequency. Musculoskeletal: Negative for arthralgias and back pain. Skin: Negative for rash and wound. Neurological: Positive for speech difficulty. Negative for weakness and headaches. Hematological: Negative for adenopathy. Psychiatric/Behavioral: Positive for confusion. All other systems reviewed and are negative. Physical Exam  Vitals and nursing note reviewed. Constitutional:       General: She is not in acute distress. Appearance: Normal appearance. She is well-developed. She is not ill-appearing. HENT:      Head: Normocephalic and atraumatic. Eyes:      Extraocular Movements: Extraocular movements intact. Pupils: Pupils are equal, round, and reactive to light. Comments: Normal vision in all visual fields. Identifies number in all visual fields   Cardiovascular:      Rate and Rhythm: Normal rate and regular rhythm. Pulses: Normal pulses. Pulmonary:      Effort: Pulmonary effort is normal. No respiratory distress. Breath sounds: Normal breath sounds. No wheezing or rales.    Abdominal: Palpations: Abdomen is soft. Tenderness: There is no abdominal tenderness. There is no right CVA tenderness, left CVA tenderness, guarding or rebound. Musculoskeletal:         General: No swelling or tenderness. Normal range of motion. Cervical back: Normal range of motion and neck supple. No tenderness. Skin:     General: Skin is warm and dry. Capillary Refill: Capillary refill takes less than 2 seconds. Neurological:      General: No focal deficit present. Mental Status: She is alert and oriented to person, place, and time. Cranial Nerves: No cranial nerve deficit. Sensory: No sensory deficit. Motor: No weakness. Coordination: Coordination normal.      Gait: Gait normal.      Comments: No deficits. No dysarthria or aphasia. NIH of 0. No ataxia. No drift. Normal finger-to-nose. Negative Romberg        NIH Stroke Scale/Score at time of initial evaluation:  1A: Level of Consciousness 0 - alert; keenly responsive   1B: Ask Month and Age 0 - answers both questions correctly   1C:  Tell Patient To Open and Close Eyes, then Hand  Squeeze 0 - performs both tasks correctly   2: Test Horizontal Extraocular Movements 0 - normal   3: Test Visual Fields 0 - no visual loss   4: Test Facial Palsy 0 - normal symmetric movement   5A: Test Left Arm Motor Drift 0 - no drift, limb holds 90 (or 45) degrees for full 10 seconds   5B: Test Right Arm Motor Drift 0 - no drift, limb holds 90 (or 45) degrees for full 10 seconds   6A: Test Left Leg Motor Drift 0 - no drift; leg holds 30 degree position for full 5 seconds   6B: Test Right Leg Motor Drift 0 - no drift; leg holds 30 degree position for full 5 seconds   7: Test Limb Ataxia   (FNF/Heel-Shin) 0 - absent   8: Test Sensation 0 - normal; no sensory loss   9: Test Language/Aphasia 0 - no aphasia, normal   10: Test Dysarthria 0 - normal   11: Test Extinction/Inattention 0 - no abnormality   Total Score: 0   3/13/22 at 11:08 AM EDT.      Acute CVA Core Measures:      - t-PA Eligibility: IV t-PA was considered and not given due to violations in inclusion criteria including mild/rapidly resolving deficit and last complete known well was 1 am. symtomps waxing and waning   - The case has been discussed with tele stroke, they agree this patient is NOT t-PA eligible. Procedures     MDM   Patient presented to the Emergency Department for facial droop, slurring speech. They are clinically stable, vital signs stable, non toxic appearing. NIH stroke scale on arrival is 0. Last known well 1 patient was completely normal was last night at 1 AM.  She is on waxing and waning symptoms all morning. On initial evaluation she is not having symptoms and not slurring her speech. Her NIH stroke scale is 0. Patient symptoms have resolved at this time. Concerning for a TIA. Imaging obtained. CT head reassuring. Labs reassuring. While patient was in the emergency department she had return of her symptoms. NIH stroke scale 4. She had a drift, facial droop, slurred speech. Stroke Alert was called at this time. Symptoms then resolved while in the department again. NIH stroke scale returned back to 0. Symptoms are waxing and waning. Spoke with telemetry stroke. With symptoms resolving she is not a TPA candidate. As well as her last known complete well would have been 1 AM last night. Her NIH stroke scale returned back to 0. There is less concern for large vessel occlusion. CTAs  was ordered and there is no evidence of large vessel occlusion, not endovascular candidate at this time. Perfusion normal. Consult placed to hospitalist to admit for strokelike symptoms. No chest pain no concern for acute cardiac etiology. EKG reassuring. Troponin XI. Tele stroke states patient is not a TPA candidate nor an endovascular candidate. They recommend dual antiplatelet which was ordered. She has no shortness of breath.   She did have mildly elevated D-dimer but less concern for PE at this time. She is not hypoxic. No shortness of breath. Hospitalist came to evaluate the patient and will admit. They ordered a stat MRI. On repeat evaluation prior to patient going MRI her NIH stroke scale is still once again 0. Would benefit from admission and neurology work-up      ED Course as of 03/13/22 1627   Sun Mar 13, 2022   1135   ATTENDING PROVIDER ATTESTATION:     I have personally performed and/or participated in the history, exam, medical decision making, and procedures and agree with all pertinent clinical information unless otherwise noted. I have also reviewed and agree with the past medical, family and social history unless otherwise noted. I have discussed this patient in detail with the resident and provided the instruction and education regarding the evidence-based evaluation and treatment of strokelike symptoms. Patient brought in for evaluation of slurred speech. The daughter had called her this morning and she was confused and having slurred speech. Patient woke up around 8 AM and her last known well was around 1:00 this morning. Patient not currently experiencing any symptoms. .      My findings: Abimael Sanchez is a 60 y.o. female whom is in no distress. Physical exam reveals patient lying in bed comfortably. Heart regular rhythm. Lungs clear to auscultation. No focal neurological deficits appreciated. No ataxia with finger-to-nose. No pronator drift. Sensation grossly intact. No facial droop appreciated. My plan: Symptomatic and supportive care. Appropriate labs and imaging    Electronically signed by Sherwin Campbell DO on 3/13/22 at 11:35 AM EDT       [MS]   4361 Denies recurrence of strokelike symptoms. She has left-sided facial droop as well as left arm and leg weakness. Will speak with on-call neurology.  [MS]   6048 NIH Stroke Scale/Score at time of initial evaluation:  1A: Level of Consciousness 0 - alert; keenly responsive  1B: Ask Month and Age 0 - answers both questions correctly  1C: Tell Patient To Open and Close Eyes, then Hand  Squeeze 0 - performs both tasks correctly  2: Test Horizontal Extraocular Movements 0 - normal  3: Test Visual Fields 0 - no visual loss  4: Test Facial Palsy 1 - minor paralysis (flattened nasolabial fold, asymmetric on smiling)  5A: Test Left Arm Motor Drift 1 - drift, limb holds 90 (or 45) degrees but drifts down before full 10 seconds: does not hit bed  5B: Test Right Arm Motor Drift 0 - no drift, limb holds 90 (or 45) degrees for full 10 seconds  6A: Test Left Leg Motor Drift 0 - no drift; leg holds 30 degree position for full 5 seconds  6B: Test Right Leg Motor Drift 0 - no drift; leg holds 30 degree position for full 5 seconds  7: Test Limb Ataxia   (FNF/Heel-Shin) 1 - present in one limb  8: Test Sensation 0 - normal; no sensory loss  9: Test Language/Aphasia 0 - no aphasia, normal  10: Test Dysarthria 1 - mild to moderate, patient slurs at least some words and at worst, can be understood with some difficulty  11: Test Extinction/Inattention 0 - no abnormality  Total Score: 4  3/13/22 at 12:03. Acute CVA Core Measures:      - t-PA Eligibility: IV t-PA was considered and not given due to violations in inclusion criteria including mild/rapidly resolving deficit   -  [KP]   4160 Called to the room as patient complaining of heaviness of the left arm. On evaluation patient does have drift of the left arm as well as facial droop and slurring of her speech. This is new from the initial evaluation earlier. This is the symptoms that the  states the daughter said was happening earlier. Stroke alert called. CTAs ordered. Taken taken over to CAT scan. In route to CAT scan patient symptoms have resolved again. Facial droop has resolved, NIH 0 and no slurring of speech. We will still proceed with CTs as well as neurology consult [KP]   [id number] with telemetry stroke. Not a TPA candidate at this current time. Recommending keeping patient's blood pressure high as she is not a TPA candidate at this time. We will proceed with CT imaging []   26 Admitting came to ED to eval, patient just got out of CT and going straight to MRI []   0900 Discussed case again with neurology. He looked at the perfusion scans and does not believe there is a mismatch. He recommended keeping the blood pressure up and would like the patient be loaded with 325 aspirin and 300 mg of Plavix [MS]      ED Course User Index  [KP] Janie Harrell DO  [MS] Verito Perez DO      ----------------------------------------------- PAST HISTORY --------------------------------------------  Past Medical History:  has a past medical history of Goiter, Hypertension, and Thyroid disease. Past Surgical History:  has a past surgical history that includes Appendectomy;  section; Tubal ligation; Breast lumpectomy; and Total Thyroidectomy (12). Social History:  reports that she has been smoking cigarettes. She has a 20.00 pack-year smoking history. She has never used smokeless tobacco. She reports that she does not drink alcohol and does not use drugs. Family History: family history is not on file. The patients home medications have been reviewed.     Allergies: Vicodin [hydrocodone-acetaminophen]    ------------------------------------------------ RESULTS ---------------------------------------------------    LABS:  Results for orders placed or performed during the hospital encounter of 22   CBC with Auto Differential   Result Value Ref Range    WBC 8.8 4.5 - 11.5 E9/L    RBC 4.96 3.50 - 5.50 E12/L    Hemoglobin 13.7 11.5 - 15.5 g/dL    Hematocrit 43.2 34.0 - 48.0 %    MCV 87.1 80.0 - 99.9 fL    MCH 27.6 26.0 - 35.0 pg    MCHC 31.7 (L) 32.0 - 34.5 %    RDW 14.9 11.5 - 15.0 fL    Platelets 844 693 - 722 E9/L    MPV 10.0 7.0 - 12.0 fL    Neutrophils % 67.9 43.0 - 80.0 % Immature Granulocytes % 0.3 0.0 - 5.0 %    Lymphocytes % 24.3 20.0 - 42.0 %    Monocytes % 5.3 2.0 - 12.0 %    Eosinophils % 1.6 0.0 - 6.0 %    Basophils % 0.6 0.0 - 2.0 %    Neutrophils Absolute 5.98 1.80 - 7.30 E9/L    Immature Granulocytes # 0.03 E9/L    Lymphocytes Absolute 2.14 1.50 - 4.00 E9/L    Monocytes Absolute 0.47 0.10 - 0.95 E9/L    Eosinophils Absolute 0.14 0.05 - 0.50 E9/L    Basophils Absolute 0.05 0.00 - 0.20 E9/L   Comprehensive Metabolic Panel w/ Reflex to MG   Result Value Ref Range    Sodium 141 132 - 146 mmol/L    Potassium reflex Magnesium 3.7 3.5 - 5.0 mmol/L    Chloride 103 98 - 107 mmol/L    CO2 25 22 - 29 mmol/L    Anion Gap 13 7 - 16 mmol/L    Glucose 115 (H) 74 - 99 mg/dL    BUN 10 6 - 20 mg/dL    CREATININE 0.7 0.5 - 1.0 mg/dL    GFR Non-African American >60 >=60 mL/min/1.73    GFR African American >60     Calcium 8.8 8.6 - 10.2 mg/dL    Total Protein 7.6 6.4 - 8.3 g/dL    Albumin 4.1 3.5 - 5.2 g/dL    Total Bilirubin 0.3 0.0 - 1.2 mg/dL    Alkaline Phosphatase 102 35 - 104 U/L    ALT 20 0 - 32 U/L    AST 17 0 - 31 U/L   Troponin   Result Value Ref Range    Troponin, High Sensitivity 11 (H) 0 - 9 ng/L   Urinalysis with Microscopic   Result Value Ref Range    Color, UA Yellow Straw/Yellow    Clarity, UA Clear Clear    Glucose, Ur Negative Negative mg/dL    Bilirubin Urine Negative Negative    Ketones, Urine Negative Negative mg/dL    Specific Gravity, UA <=1.005 1.005 - 1.030    Blood, Urine Negative Negative    pH, UA 6.0 5.0 - 9.0    Protein, UA Negative Negative mg/dL    Urobilinogen, Urine 0.2 <2.0 E.U./dL    Nitrite, Urine Negative Negative    Leukocyte Esterase, Urine Negative Negative    WBC, UA 2-5 0 - 5 /HPF    RBC, UA 1-3 0 - 2 /HPF    Epithelial Cells, UA FEW /HPF    Bacteria, UA RARE (A) None Seen /HPF   Serum Drug Screen   Result Value Ref Range    Ethanol Lvl <10 mg/dL    Acetaminophen Level <5.0 (L) 10.0 - 20.9 mcg/mL    Salicylate, Serum <9.0 0.0 - 30.0 mg/dL    TCA Scrn NEGATIVE Cutoff:300 ng/mL   URINE DRUG SCREEN   Result Value Ref Range    Amphetamine Screen, Urine NOT DETECTED Negative <1000 ng/mL    Barbiturate Screen, Ur NOT DETECTED Negative < 200 ng/mL    Benzodiazepine Screen, Urine NOT DETECTED Negative < 200 ng/mL    Cannabinoid Scrn, Ur NOT DETECTED Negative < 50ng/mL    Cocaine Metabolite Screen, Urine NOT DETECTED Negative < 300 ng/mL    Opiate Scrn, Ur NOT DETECTED Negative < 300ng/mL    PCP Screen, Urine NOT DETECTED Negative < 25 ng/mL    Methadone Screen, Urine NOT DETECTED Negative <300 ng/mL    Oxycodone Urine NOT DETECTED Negative <100 ng/mL    FENTANYL SCREEN, URINE NOT DETECTED Negative <1 ng/mL    Drug Screen Comment: see below    TSH   Result Value Ref Range    TSH 2.900 0.270 - 4.200 uIU/mL   D-Dimer, Quantitative   Result Value Ref Range    D-Dimer, Quant 440 ng/mL DDU   Blood Gas, Arterial   Result Value Ref Range    Date Analyzed [id number]     Time Analyzed 1135     Source: Blood Arterial     pH, Blood Gas 7.441 7.350 - 7.450    PCO2 34.1 (L) 35.0 - 45.0 mmHg    PO2 80.8 75.0 - 100.0 mmHg    HCO3 22.7 22.0 - 26.0 mmol/L    B.E. -0.8 -3.0 - 3.0 mmol/L    O2 Sat 96.4 92.0 - 98.5 %    O2Hb 95.0 94.0 - 97.0 %    COHb 1.2 0.0 - 1.5 %    MetHb 0.3 0.0 - 1.5 %    O2 Content 19.0 mL/dL    HHb 3.5 0.0 - 5.0 %    tHb (est) 14.2 11.5 - 16.5 g/dL    Mode RA     Date Of Collection      Time Collected      Pt Temp 37.0 C     ID 03     Lab [id number]     Critical(s) Notified . No Critical Values    POCT Glucose   Result Value Ref Range    Meter Glucose 104 (H) 74 - 99 mg/dL   EKG 12 Lead   Result Value Ref Range    Ventricular Rate 79 BPM    Atrial Rate 79 BPM    P-R Interval 136 ms    QRS Duration 82 ms    Q-T Interval 412 ms    QTc Calculation (Bazett) 472 ms    P Axis 57 degrees    R Axis 8 degrees    T Axis 49 degrees       RADIOLOGY:    All Radiology results interpreted by Radiologist unless otherwise noted.   US CAROTID ARTERY BILATERAL   Final Result   The right internal carotid artery demonstrates 0-50% stenosis. The left internal carotid artery demonstrates 0-50% stenosis. Bilateral vertebral arteries are patent with flow in the normal direction. RECOMMENDATIONS:   Unavailable         MRI BRAIN WO CONTRAST   Final Result   1. Findings consistent with an acute/subacute infarct involving the right   basal ganglia and extending up into the right frontal corona radiata. 2. Old infarct involving the left caudate head and basal ganglia and small   old right basal ganglia lacunar infarcts. Chronic white matter ischemic   changes. 3. Left mastoid effusion. CTA HEAD W CONTRAST   Final Result   1. No perfusion mismatch. 2. About 50% diameter stenosis of the left carotid siphon. 3. Otherwise, unremarkable CTA of the head. 4. Unremarkable CTA of the neck. 5. 1.4 cm low-density left thyroid nodule. No follow-up is indicated. CTA NECK W CONTRAST   Final Result   1. No perfusion mismatch. 2. About 50% diameter stenosis of the left carotid siphon. 3. Otherwise, unremarkable CTA of the head. 4. Unremarkable CTA of the neck. 5. 1.4 cm low-density left thyroid nodule. No follow-up is indicated. CT BRAIN PERFUSION   Final Result   1. No perfusion mismatch. 2. About 50% diameter stenosis of the left carotid siphon. 3. Otherwise, unremarkable CTA of the head. 4. Unremarkable CTA of the neck. 5. 1.4 cm low-density left thyroid nodule. No follow-up is indicated. XR CHEST PORTABLE   Final Result   No acute consolidation or infiltrate. Rule out atelectasis versus pulmonary   parenchymal scar formation in the mid lung fields. CT Head WO Contrast   Final Result   1. No significant interval changes since the previous study of July 3, 2021   in relation to the intracranial structures.       2.  Areas of old insult in both the cerebral hemispheres as above commented   the.      3.  No indication for acute intracranial process. RECOMMENDATIONS:   Unavailable             EKG:  This EKG is signed and interpreted by ED Physician. Time:  1114   Rate: 79  Rhythm: Sinus. Interpretation: non-specific EKG. normal axis deviation. No STEMI. QTc 472. Comparison: no previous EKG.        ---------------------------- NURSING NOTES AND VITALS REVIEWED -------------------------   The nursing notes within the ED encounter and vital signs as below have been reviewed.    BP (!) 164/78   Pulse 92   Temp 97.9 °F (36.6 °C) (Oral)   Resp 24   Ht 5' 1\" (1.549 m)   Wt 168 lb (76.2 kg)   LMP 11/18/2011   SpO2 98%   BMI 31.74 kg/m²   Oxygen Saturation Interpretation: Normal      ------------------------------------------PROGRESS NOTES -------------------------------------------    ED COURSE MEDICATIONS:                Medications   perflutren lipid microspheres (DEFINITY) injection 1.65 mg (has no administration in time range)   levothyroxine (SYNTHROID) tablet 50 mcg (50 mcg Oral Not Given 3/13/22 1503)   lisinopril (PRINIVIL;ZESTRIL) tablet 10 mg ( Oral Automatically Held 3/16/22 0900)   metoprolol tartrate (LOPRESSOR) tablet 25 mg ( Oral Automatically Held 3/16/22 2100)   magnesium sulfate 1000 mg in dextrose 5% 100 mL IVPB (has no administration in time range)   potassium chloride (KLOR-CON M) extended release tablet 40 mEq (has no administration in time range)     Or   potassium bicarb-citric acid (EFFER-K) effervescent tablet 40 mEq (has no administration in time range)     Or   potassium chloride 10 mEq/100 mL IVPB (Peripheral Line) (has no administration in time range)   ondansetron (ZOFRAN-ODT) disintegrating tablet 4 mg (has no administration in time range)     Or   ondansetron (ZOFRAN) injection 4 mg (has no administration in time range)   polyethylene glycol (GLYCOLAX) packet 17 g (has no administration in time range)   enoxaparin (LOVENOX) injection 40 mg (40 mg SubCUTAneous Given 3/13/22 6860)   atorvastatin (LIPITOR) tablet 80 mg (has no administration in time range)   clopidogrel (PLAVIX) tablet 75 mg (75 mg Oral Not Given 3/13/22 1511)   labetalol (NORMODYNE;TRANDATE) injection 10 mg (10 mg IntraVENous Given 3/13/22 1536)   clopidogrel (PLAVIX) tablet 300 mg (has no administration in time range)   0.9 % sodium chloride bolus (0 mLs IntraVENous Stopped 3/13/22 1408)   aspirin chewable tablet 324 mg (324 mg Oral Given 3/13/22 1534)   iopamidol (ISOVUE-370) 76 % injection 140 mL (140 mLs IntraVENous Given 3/13/22 1234)       CONSULTATIONS:            Consultation:  I Spoke with tele neurology  Discussed case. They will provide consultation. Consultation:  I Spoke with , medicine Discussed case. They will admit this patient. Paul Bain PROCEDURES:            none. COUNSELING:   I have spoken with the spouse and patient and discussed todays results, in addition to providing specific details for the plan of care and counseling regarding the diagnosis and prognosis.     --------------------------------------- IMPRESSION & DISPOSITION --------------------------------     IMPRESSION(s):  1. Stroke-like symptoms        This patient's ED course included: a personal history and physicial examination, multiple bedside re-evaluations, cardiac monitoring and continuous pulse oximetry    This patient has been closely monitored during their ED course. DISPOSITION:  Disposition: Admit to telemetry. Patient condition is serious. END OF PROVIDER NOTE.        Jermaine West DO  Resident  03/13/22 9058

## 2022-03-14 VITALS
OXYGEN SATURATION: 95 % | WEIGHT: 168 LBS | RESPIRATION RATE: 18 BRPM | BODY MASS INDEX: 31.72 KG/M2 | HEIGHT: 61 IN | HEART RATE: 88 BPM | TEMPERATURE: 98.1 F | DIASTOLIC BLOOD PRESSURE: 90 MMHG | SYSTOLIC BLOOD PRESSURE: 168 MMHG

## 2022-03-14 LAB
ANION GAP SERPL CALCULATED.3IONS-SCNC: 12 MMOL/L (ref 7–16)
BASOPHILS ABSOLUTE: 0.05 E9/L (ref 0–0.2)
BASOPHILS RELATIVE PERCENT: 0.6 % (ref 0–2)
BUN BLDV-MCNC: 7 MG/DL (ref 6–20)
CALCIUM SERPL-MCNC: 8.8 MG/DL (ref 8.6–10.2)
CHLORIDE BLD-SCNC: 104 MMOL/L (ref 98–107)
CHOLESTEROL, TOTAL: 198 MG/DL (ref 0–199)
CO2: 25 MMOL/L (ref 22–29)
CREAT SERPL-MCNC: 0.6 MG/DL (ref 0.5–1)
EOSINOPHILS ABSOLUTE: 0.15 E9/L (ref 0.05–0.5)
EOSINOPHILS RELATIVE PERCENT: 1.8 % (ref 0–6)
GFR AFRICAN AMERICAN: >60
GFR NON-AFRICAN AMERICAN: >60 ML/MIN/1.73
GLUCOSE BLD-MCNC: 108 MG/DL (ref 74–99)
HBA1C MFR BLD: 5.5 % (ref 4–5.6)
HCT VFR BLD CALC: 41.9 % (ref 34–48)
HDLC SERPL-MCNC: 48 MG/DL
HEMOGLOBIN: 13.1 G/DL (ref 11.5–15.5)
IMMATURE GRANULOCYTES #: 0.04 E9/L
IMMATURE GRANULOCYTES %: 0.5 % (ref 0–5)
LDL CHOLESTEROL CALCULATED: 129 MG/DL (ref 0–99)
LV EF: 62 %
LVEF MODALITY: NORMAL
LYMPHOCYTES ABSOLUTE: 2.07 E9/L (ref 1.5–4)
LYMPHOCYTES RELATIVE PERCENT: 24.3 % (ref 20–42)
MAGNESIUM: 2.1 MG/DL (ref 1.6–2.6)
MCH RBC QN AUTO: 27.8 PG (ref 26–35)
MCHC RBC AUTO-ENTMCNC: 31.3 % (ref 32–34.5)
MCV RBC AUTO: 88.8 FL (ref 80–99.9)
MONOCYTES ABSOLUTE: 0.42 E9/L (ref 0.1–0.95)
MONOCYTES RELATIVE PERCENT: 4.9 % (ref 2–12)
NEUTROPHILS ABSOLUTE: 5.78 E9/L (ref 1.8–7.3)
NEUTROPHILS RELATIVE PERCENT: 67.9 % (ref 43–80)
PDW BLD-RTO: 15.1 FL (ref 11.5–15)
PLATELET # BLD: 240 E9/L (ref 130–450)
PMV BLD AUTO: 9.9 FL (ref 7–12)
POTASSIUM SERPL-SCNC: 3.3 MMOL/L (ref 3.5–5)
RBC # BLD: 4.72 E12/L (ref 3.5–5.5)
SODIUM BLD-SCNC: 141 MMOL/L (ref 132–146)
T4 FREE: 1.04 NG/DL (ref 0.93–1.7)
TRIGL SERPL-MCNC: 106 MG/DL (ref 0–149)
VLDLC SERPL CALC-MCNC: 21 MG/DL
WBC # BLD: 8.5 E9/L (ref 4.5–11.5)

## 2022-03-14 PROCEDURE — 97530 THERAPEUTIC ACTIVITIES: CPT

## 2022-03-14 PROCEDURE — 85025 COMPLETE CBC W/AUTO DIFF WBC: CPT

## 2022-03-14 PROCEDURE — 6370000000 HC RX 637 (ALT 250 FOR IP): Performed by: INTERNAL MEDICINE

## 2022-03-14 PROCEDURE — 92523 SPEECH SOUND LANG COMPREHEN: CPT | Performed by: SPEECH-LANGUAGE PATHOLOGIST

## 2022-03-14 PROCEDURE — 80048 BASIC METABOLIC PNL TOTAL CA: CPT

## 2022-03-14 PROCEDURE — 84439 ASSAY OF FREE THYROXINE: CPT

## 2022-03-14 PROCEDURE — 6360000002 HC RX W HCPCS: Performed by: INTERNAL MEDICINE

## 2022-03-14 PROCEDURE — 80061 LIPID PANEL: CPT

## 2022-03-14 PROCEDURE — 93306 TTE W/DOPPLER COMPLETE: CPT

## 2022-03-14 PROCEDURE — 36415 COLL VENOUS BLD VENIPUNCTURE: CPT

## 2022-03-14 PROCEDURE — 83735 ASSAY OF MAGNESIUM: CPT

## 2022-03-14 PROCEDURE — 83036 HEMOGLOBIN GLYCOSYLATED A1C: CPT

## 2022-03-14 PROCEDURE — 97165 OT EVAL LOW COMPLEX 30 MIN: CPT

## 2022-03-14 RX ORDER — ASPIRIN 81 MG/1
81 TABLET, CHEWABLE ORAL DAILY
Qty: 30 TABLET | Refills: 3 | Status: SHIPPED | OUTPATIENT
Start: 2022-03-15

## 2022-03-14 RX ORDER — LISINOPRIL 10 MG/1
10 TABLET ORAL DAILY
Qty: 30 TABLET | Refills: 3 | Status: SHIPPED | OUTPATIENT
Start: 2022-03-14

## 2022-03-14 RX ORDER — CLOPIDOGREL BISULFATE 75 MG/1
75 TABLET ORAL DAILY
Qty: 30 TABLET | Refills: 3 | Status: SHIPPED | OUTPATIENT
Start: 2022-03-15

## 2022-03-14 RX ORDER — ASPIRIN 81 MG/1
81 TABLET, CHEWABLE ORAL DAILY
Status: DISCONTINUED | OUTPATIENT
Start: 2022-03-14 | End: 2022-03-14 | Stop reason: HOSPADM

## 2022-03-14 RX ORDER — ATORVASTATIN CALCIUM 80 MG/1
80 TABLET, FILM COATED ORAL NIGHTLY
Qty: 30 TABLET | Refills: 3 | Status: SHIPPED | OUTPATIENT
Start: 2022-03-14

## 2022-03-14 RX ADMIN — LISINOPRIL 10 MG: 10 TABLET ORAL at 08:11

## 2022-03-14 RX ADMIN — METOPROLOL TARTRATE 25 MG: 25 TABLET, FILM COATED ORAL at 08:11

## 2022-03-14 RX ADMIN — ENOXAPARIN SODIUM 40 MG: 100 INJECTION SUBCUTANEOUS at 08:16

## 2022-03-14 RX ADMIN — LEVOTHYROXINE SODIUM 50 MCG: 50 TABLET ORAL at 07:00

## 2022-03-14 RX ADMIN — CLOPIDOGREL BISULFATE 75 MG: 75 TABLET ORAL at 08:11

## 2022-03-14 RX ADMIN — ASPIRIN 81 MG 81 MG: 81 TABLET ORAL at 08:11

## 2022-03-14 RX ADMIN — POTASSIUM CHLORIDE 40 MEQ: 20 TABLET, EXTENDED RELEASE ORAL at 09:43

## 2022-03-14 ASSESSMENT — PAIN SCALES - GENERAL
PAINLEVEL_OUTOF10: 0

## 2022-03-14 NOTE — PLAN OF CARE
Problem: Falls - Risk of:  Goal: Will remain free from falls  Description: Will remain free from falls  Outcome: Completed     Problem: Falls - Risk of:  Goal: Absence of physical injury  Description: Absence of physical injury  Outcome: Completed     Problem: HEMODYNAMIC STATUS  Goal: Patient has stable vital signs and fluid balance  Outcome: Completed     Problem: ACTIVITY INTOLERANCE/IMPAIRED MOBILITY  Goal: Mobility/activity is maintained at optimum level for patient  3/14/2022 1243 by Lavelle Gaucher, RN  Outcome: Completed     Problem: COMMUNICATION IMPAIRMENT  Goal: Ability to express needs and understand communication  3/14/2022 1243 by Lavelle Gaucher, RN  Outcome: Completed

## 2022-03-14 NOTE — PROGRESS NOTES
SPEECH/LANGUAGE PATHOLOGY  SPEECH/LANGUAGE/COGNITIVE EVALUATION   and PLAN OF CARE      PATIENT NAME:  Dre Nath  (female)     MRN:  [id number]    :  1965  (60 y.o.)  STATUS:  Inpatient: Room 0423/0423-02    TODAY'S DATE:  3/14/2022  REFERRING PROVIDER:     Speech Language Pathology (SLP) eval and treat Start: 22 1230, End: 22 1230, ONE TIME, Standing Count: 1 Occurrences, R    Cely Bass,   REASON FOR REFERRAL: dysphagia   EVALUATING THERAPIST: JOSE ARMANDO Mota    ADMITTING DIAGNOSIS: Acute CVA (cerebrovascular accident) [facility]) [I63.9]    VISIT DIAGNOSIS:      SPEECH THERAPY  PLAN OF CARE   The speech therapy  POC is established based on physician order, speech pathology diagnosis and results of clinical assessment     SPEECH PATHOLOGY DIAGNOSIS:    Within function limits    Speech Pathology intervention is not warranted at this time. Conditions Requiring Skilled Therapeutic Intervention for speech, language and/or cognition  Not applicable     Specific Speech Therapy Interventions to Include:   Not applicable    Specific instructions for next treatment:    Not applicable    SHORT/LONG TERM GOALS  Not applicable      Patient goals: Patient/family involved in developing goals and treatment plan:   Treatment goals discussed with Patient and Family    The Patient and Family understand(s) the diagnosis, prognosis and plan of care   The patient/family Agreed with above,     This plan may be re-evaluated and revised as warranted. Rehabilitation Potential/Prognosis: not applicable                CLINICAL ASSESSMENT:  MOTOR SPEECH       Oral Peripheral Examination   Adequate lingual/labial strength     Parameters of Speech Production  Respiration:  Adequate for speech production  Articulation:  Within functional limits  Resonance:  Within functional limits  Quality:   Within functional limits  Pitch:     Within functional limits  Intensity: Within functional limits  Fluency: Intact  Prosody Intact    RECEPTIVE LANGUAGE    Comprehension of Yes/No Questions: Within functional limits    Process  Simple Verbal Commands:   Within functional limits  Process Intermediate Verbal Commands:   Within functional limits  Process Complex Verbal Commands:     Within functional limits    Comprehension of Conversation:      Within functional limits      EXPRESSIVE LANGUAGE     Serials: Functional    Imitation:  Words   Functional   Sentences Functional    Naming:  (Modality used:  Verbal)  Confrontation Naming  Functional  Functional Description  Functional  Response Naming: Functional    Conversation:      Conversation was within functional limits    COGNITION     Attention/Orientation  Attention: Sustained attention   Orientation:  Oriented to Person, Place, Date, Reason for hospitalization    Memory   Immediate Recall: Repeated 3/3    Delayed Recall:   Recalled  3/3  Long Term Recall:   Recalled Address, Birthdate, Age, and Family    Organization/Problem Solving/Reasoning   Verbal Sequencing:   Functional        Verbal Problem solving:   Functional          CLINICAL OBSERVATIONS NOTED DURING THE EVALUATION  Within functional limits                  EDUCATION:   The Speech Language Pathologist (SLP) completed education regarding results of evaluation and that intervention is not warranted at this time. Learner: Patient  Education: Reviewed results and recommendations of this evaluation  Evaluation of Education:  Verbalizes understanding    Evaluation Time includes thorough review of current medical information, gathering information on past medical history/social history and prior level of function, completion of standardized testing/informal observation of tasks, assessment of data and education on plan of care and goals.       CPT code:    85458  eval speech sound lang comprehension      The admitting diagnosis and active problem list, as listed below have been reviewed prior to initiation of this evaluation.         ACTIVE PROBLEM LIST:   Patient Active Problem List   Diagnosis    Multinodular goiter    Hyperthyroidism    Dysphagia    Acute CVA (cerebrovascular accident) ([facility] [address]    Stroke-like symptoms       Zainab Liner MSCCC/SLP  Speech Language Pathologist  [id number]

## 2022-03-14 NOTE — DISCHARGE SUMMARY
Internal Medicine Progress Note     [facility]     Kailyn Crooks. Aaron Manzanares., ITA.[email or web address] Kramer D.O., AMENAOADRI Mejia D.O. Gail Schaefer, MSN, APRN, NP-C  Esme Tate. Loida Eastman, MSN, APRN-CNP       Internal Medicine  Discharge Summary    NAME: Nayana Haley  :  1965  MRN:  [id number]  PCP:Joseph A Potocki, DO  ADMITTED: 3/13/2022      DISCHARGED: 3/14/22    ADMITTING PHYSICIAN: Jeannie Allred DO    CONSULTANT(S):   None     ADMITTING DIAGNOSIS:   Acute CVA (cerebrovascular accident) ([facility] [address] [I63.9]     DISCHARGE DIAGNOSES:   · Acute CVA right basal ganglia extending into the right frontal corona radiata and prior CVA  · Moderate carotid stenosis   · Hypertensive emergency with underlying essential hypertension  · Hypothyroidism  · Obesity with BMI 31.74 kg/m²      BRIEF HISTORY OF PRESENT ILLNESS:   Cinthia 53-year-old female patient who presented to [address] [place] [address] emergency department concern of stroke. She developed some slurred speech, altered mentation and weakness. Onset of symptoms was between 8 and 8:30 AM.  ER presentation revealed resolution of symptoms upon arrival.  NIH at that time scored 0. CT scan showed remote appearing infarct but nothing acute. She was markedly hypertensive on presentation as well. Around noon, the patient's symptoms recurred and she was found to be significantly less hypertensive. CTAs were ordered and have been performed but are not yet resulted. Extensive work-up was undertaken including urinalysis with urine drug screen returned negative, serum drug screen and alcohol that returned negative, CBC that was unremarkable, D-dimer that was only mildly elevated at 440, developmental that revealed very mild hyperglycemia but was otherwise unremarkable. Hepatic function panel was also assessed and negative. TSH was normal.  High-sensitivity troponin was not significantly elevated.   Case was discussed with the internal medicine physician and the ER physician, all parties are agreeable with plan for admission at this time.     Patient was seen and assessed at bedside following CTA completion. She is again having some slurred speech and dysarthria as well as left upper extremity weakness. She denies headache. She denies any known history of stroke. She denies any recent injury, falls or trauma. She denies any use of intoxicating substances or illicit drugs. She denies any additional neurological complaints. She denies chest pain or palpitations. She denies shortness of breath, cough or URI complaints. She denies abdominal pain, nausea vomiting, bowel changes, hematochezia or melena. Denies acute urinary complaints.     I have discussed the case with the ER physician resident as they have updated the neurologist involved in the case. Recommendations were for permissive hypertension as the patient's change in status was associated with a significant decrease in blood pressure. Stat MRI has been arranged and the patient is for MRI at this immediate time.     LABS[de-identified]  Lab Results   Component Value Date    WBC 8.5 03/14/2022    HGB 13.1 03/14/2022    HCT 41.9 03/14/2022     03/14/2022     03/13/2022    K 3.7 03/13/2022     03/13/2022    CREATININE 0.7 03/13/2022    BUN 10 03/13/2022    CO2 25 03/13/2022    GLUCOSE 115 (H) 03/13/2022    ALT 20 03/13/2022    AST 17 03/13/2022     No results found for: INR, PROTIME   Lab Results   Component Value Date    TSH 2.900 03/13/2022     Lab Results   Component Value Date    TRIG 231 (H) 06/07/2017    TRIG 88 04/08/2015    TRIG 103 10/10/2011     Lab Results   Component Value Date    HDL 42 06/07/2017    HDL 49 04/08/2015    HDL 51.0 10/10/2011     Lab Results   Component Value Date    LDLCALC 101 (H) 06/07/2017    LDLCALC 115 (H) 04/08/2015    LDLCALC 82 10/10/2011     No results found for: LABA1C    IMAGING:  CT Head WO Contrast    Result Date: 3/13/2022  EXAMINATION: CT OF THE HEAD WITHOUT CONTRAST  3/13/2022 11:19 am TECHNIQUE: CT of the head was performed without the administration of intravenous contrast. Dose modulation, iterative reconstruction, and/or weight based adjustment of the mA/kV was utilized to reduce the radiation dose to as low as reasonably achievable. COMPARISON: Comparison study of a July 30, 2021. HISTORY: ORDERING SYSTEM PROVIDED HISTORY: AMS TECHNOLOGIST PROVIDED HISTORY: Reason for exam:->AMS Has a \"code stroke\" or \"stroke alert\" been called? ->No Decision Support Exception - unselect if not a suspected or confirmed emergency medical condition->Emergency Medical Condition (MA) FINDINGS: Presence of areas of old insult to the brain parenchyma as seen in the deep left frontal lobe in the region of the frontal horn/head of the left caudate nucleus and also in the right basal ganglia region anterior and medially. These findings were seen on the previous examination. This can be related with the small-vessel insults. The discrete amorphous calcifications are seen in the basal ganglia area as observed previously. There is no focal mass effect or midline shift. Images with bone window settings demonstrate no significant findings. The There is no evidence for a sizable area of a new acute recent insult to the brain parenchyma. There is no indication for acute intracranial hemorrhagic event. Peripheral CSF space and ventricular system correlates with the age group. Delete     1. No significant interval changes since the previous study of July 3, 2021 in relation to the intracranial structures. 2.  Areas of old insult in both the cerebral hemispheres as above commented the. 3.  No indication for acute intracranial process. RECOMMENDATIONS: Unavailable     XR CHEST PORTABLE    Result Date: 3/13/2022  EXAMINATION: ONE XRAY VIEW OF THE CHEST 3/13/2022 11:29 am COMPARISON: The previous study performed 06/07/2017.  HISTORY: ORDERING SYSTEM dated 03/13/2022 HISTORY: ORDERING SYSTEM PROVIDED HISTORY: TECHNOLOGIST PROVIDED HISTORY: Has a \"code stroke\" or \"stroke alert\" been called? ->Yes Decision Support Exception - unselect if not a suspected or confirmed emergency medical condition->Emergency Medical Condition (MA); ORDERING SYSTEM PROVIDED HISTORY: facial droop, r/o stroke TECHNOLOGIST PROVIDED HISTORY: Reason for exam:->facial droop, r/o stroke Decision Support Exception - unselect if not a suspected or confirmed emergency medical condition->Emergency Medical Condition (MA) Stroke-like symptoms. Left-sided weakness, left facial paralysis FINDINGS: CTA NECK: AORTIC ARCH/ARCH VESSELS: No dissection or arterial injury. No significant stenosis of the brachiocephalic or subclavian arteries. CAROTID ARTERIES: No dissection, arterial injury, or hemodynamically significant stenosis by NASCET criteria. VERTEBRAL ARTERIES: No dissection, arterial injury, or significant stenosis. SOFT TISSUES: The lung apices are clear. No cervical or superior mediastinal lymphadenopathy. The larynx and pharynx are unremarkable. No acute abnormality of the salivary and thyroid glands. The right thyroid lobe is surgically absent. There is a 1.4 cm low-density nodule in the left thyroid lobe. No follow-up is indicated. BONES: No acute osseous abnormality. CTA HEAD: ANTERIOR CIRCULATION: No significant stenosis of the intracranial internal carotid, anterior cerebral, or middle cerebral arteries. No aneurysm. There is soft and calcified plaque in the left carotid siphon causing stenosis up to 50% by diameter. Mild calcified plaque is seen in the right carotid siphon without significant stenosis. POSTERIOR CIRCULATION: No significant stenosis of the vertebral, basilar, or posterior cerebral arteries. No aneurysm. OTHER: No dural venous sinus thrombosis on this non-dedicated study. BRAIN: No mass effect or midline shift. No extra-axial fluid collection.  The gray-white differentiation is maintained. CT PERFUSION: EXAM QUALITY: The examination is diagnostic with appropriate arterial inflow and venous outflow curves, and diagnostic perfusion maps. CORE INFARCT: The total area of ischemic core is 0 mL (CBF<30% volume). TOTAL HYPOPERFUSION: The total area of hypoperfusion is 0 mL (Tmax>6s volume). PENUMBRA: The penumbra (mismatch) volume is 0 mL and is located in the n/a. The mismatch ratio is n/a. Findings were discussed with Dr. Jaqueline Trinidad at 1:04 pm on 3/13/2022. 1. No perfusion mismatch. 2. About 50% diameter stenosis of the left carotid siphon. 3. Otherwise, unremarkable CTA of the head. 4. Unremarkable CTA of the neck. 5. 1.4 cm low-density left thyroid nodule. No follow-up is indicated. CT BRAIN PERFUSION    Result Date: 3/13/2022  EXAMINATION: CTA OF THE HEAD WITH CONTRAST; CTA OF THE NECK; CT OF THE HEAD WITH CONTRAST 3/13/2022 12:34 pm: TECHNIQUE: CTA of the head/brain was performed with the administration of intravenous contrast. Multiplanar reformatted images are provided for review. MIP images are provided for review. Dose modulation, iterative reconstruction, and/or weight based adjustment of the mA/kV was utilized to reduce the radiation dose to as low as reasonably achievable.; CTA of the neck was performed with the administration of intravenous contrast. Multiplanar reformatted images are provided for review. MIP images are provided for review. Stenosis of the internal carotid arteries measured using NASCET criteria. Dose modulation, iterative reconstruction, and/or weight based adjustment of the mA/kV was utilized to reduce the radiation dose to as low as reasonably achievable.; CT of the head/brain was performed with the administration of intravenous contrast. Multiplanar reformatted images are provided for review.  Dose modulation, iterative reconstruction, and/or weight based adjustment of the mA/kV was utilized to reduce the radiation dose to as low as reasonably achievable. COMPARISON: Head CT dated 03/13/2022 HISTORY: ORDERING SYSTEM PROVIDED HISTORY: TECHNOLOGIST PROVIDED HISTORY: Has a \"code stroke\" or \"stroke alert\" been called? ->Yes Decision Support Exception - unselect if not a suspected or confirmed emergency medical condition->Emergency Medical Condition (MA); ORDERING SYSTEM PROVIDED HISTORY: facial droop, r/o stroke TECHNOLOGIST PROVIDED HISTORY: Reason for exam:->facial droop, r/o stroke Decision Support Exception - unselect if not a suspected or confirmed emergency medical condition->Emergency Medical Condition (MA) Stroke-like symptoms. Left-sided weakness, left facial paralysis FINDINGS: CTA NECK: AORTIC ARCH/ARCH VESSELS: No dissection or arterial injury. No significant stenosis of the brachiocephalic or subclavian arteries. CAROTID ARTERIES: No dissection, arterial injury, or hemodynamically significant stenosis by NASCET criteria. VERTEBRAL ARTERIES: No dissection, arterial injury, or significant stenosis. SOFT TISSUES: The lung apices are clear. No cervical or superior mediastinal lymphadenopathy. The larynx and pharynx are unremarkable. No acute abnormality of the salivary and thyroid glands. The right thyroid lobe is surgically absent. There is a 1.4 cm low-density nodule in the left thyroid lobe. No follow-up is indicated. BONES: No acute osseous abnormality. CTA HEAD: ANTERIOR CIRCULATION: No significant stenosis of the intracranial internal carotid, anterior cerebral, or middle cerebral arteries. No aneurysm. There is soft and calcified plaque in the left carotid siphon causing stenosis up to 50% by diameter. Mild calcified plaque is seen in the right carotid siphon without significant stenosis. POSTERIOR CIRCULATION: No significant stenosis of the vertebral, basilar, or posterior cerebral arteries. No aneurysm. OTHER: No dural venous sinus thrombosis on this non-dedicated study. BRAIN: No mass effect or midline shift.  No extra-axial fluid collection. The gray-white differentiation is maintained. CT PERFUSION: EXAM QUALITY: The examination is diagnostic with appropriate arterial inflow and venous outflow curves, and diagnostic perfusion maps. CORE INFARCT: The total area of ischemic core is 0 mL (CBF<30% volume). TOTAL HYPOPERFUSION: The total area of hypoperfusion is 0 mL (Tmax>6s volume). PENUMBRA: The penumbra (mismatch) volume is 0 mL and is located in the n/a. The mismatch ratio is n/a. Findings were discussed with Dr. Cely Gomez at 1:04 pm on 3/13/2022. 1. No perfusion mismatch. 2. About 50% diameter stenosis of the left carotid siphon. 3. Otherwise, unremarkable CTA of the head. 4. Unremarkable CTA of the neck. 5. 1.4 cm low-density left thyroid nodule. No follow-up is indicated. CTA HEAD W CONTRAST    Result Date: 3/13/2022  EXAMINATION: CTA OF THE HEAD WITH CONTRAST; CTA OF THE NECK; CT OF THE HEAD WITH CONTRAST 3/13/2022 12:34 pm: TECHNIQUE: CTA of the head/brain was performed with the administration of intravenous contrast. Multiplanar reformatted images are provided for review. MIP images are provided for review. Dose modulation, iterative reconstruction, and/or weight based adjustment of the mA/kV was utilized to reduce the radiation dose to as low as reasonably achievable.; CTA of the neck was performed with the administration of intravenous contrast. Multiplanar reformatted images are provided for review. MIP images are provided for review. Stenosis of the internal carotid arteries measured using NASCET criteria. Dose modulation, iterative reconstruction, and/or weight based adjustment of the mA/kV was utilized to reduce the radiation dose to as low as reasonably achievable.; CT of the head/brain was performed with the administration of intravenous contrast. Multiplanar reformatted images are provided for review.  Dose modulation, iterative reconstruction, and/or weight based adjustment of the mA/kV was utilized to reduce the radiation dose to as low as reasonably achievable. COMPARISON: Head CT dated 03/13/2022 HISTORY: ORDERING SYSTEM PROVIDED HISTORY: TECHNOLOGIST PROVIDED HISTORY: Has a \"code stroke\" or \"stroke alert\" been called? ->Yes Decision Support Exception - unselect if not a suspected or confirmed emergency medical condition->Emergency Medical Condition (MA); ORDERING SYSTEM PROVIDED HISTORY: facial droop, r/o stroke TECHNOLOGIST PROVIDED HISTORY: Reason for exam:->facial droop, r/o stroke Decision Support Exception - unselect if not a suspected or confirmed emergency medical condition->Emergency Medical Condition (MA) Stroke-like symptoms. Left-sided weakness, left facial paralysis FINDINGS: CTA NECK: AORTIC ARCH/ARCH VESSELS: No dissection or arterial injury. No significant stenosis of the brachiocephalic or subclavian arteries. CAROTID ARTERIES: No dissection, arterial injury, or hemodynamically significant stenosis by NASCET criteria. VERTEBRAL ARTERIES: No dissection, arterial injury, or significant stenosis. SOFT TISSUES: The lung apices are clear. No cervical or superior mediastinal lymphadenopathy. The larynx and pharynx are unremarkable. No acute abnormality of the salivary and thyroid glands. The right thyroid lobe is surgically absent. There is a 1.4 cm low-density nodule in the left thyroid lobe. No follow-up is indicated. BONES: No acute osseous abnormality. CTA HEAD: ANTERIOR CIRCULATION: No significant stenosis of the intracranial internal carotid, anterior cerebral, or middle cerebral arteries. No aneurysm. There is soft and calcified plaque in the left carotid siphon causing stenosis up to 50% by diameter. Mild calcified plaque is seen in the right carotid siphon without significant stenosis. POSTERIOR CIRCULATION: No significant stenosis of the vertebral, basilar, or posterior cerebral arteries. No aneurysm. OTHER: No dural venous sinus thrombosis on this non-dedicated study. BRAIN: No mass effect or midline shift. No extra-axial fluid collection. The gray-white differentiation is maintained. CT PERFUSION: EXAM QUALITY: The examination is diagnostic with appropriate arterial inflow and venous outflow curves, and diagnostic perfusion maps. CORE INFARCT: The total area of ischemic core is 0 mL (CBF<30% volume). TOTAL HYPOPERFUSION: The total area of hypoperfusion is 0 mL (Tmax>6s volume). PENUMBRA: The penumbra (mismatch) volume is 0 mL and is located in the n/a. The mismatch ratio is n/a. Findings were discussed with Dr. Millie Harris at 1:04 pm on 3/13/2022. 1. No perfusion mismatch. 2. About 50% diameter stenosis of the left carotid siphon. 3. Otherwise, unremarkable CTA of the head. 4. Unremarkable CTA of the neck. 5. 1.4 cm low-density left thyroid nodule. No follow-up is indicated. MRI BRAIN WO CONTRAST    Result Date: 3/13/2022  EXAMINATION: MRI OF THE BRAIN WITHOUT CONTRAST  3/13/2022 12:41 pm TECHNIQUE: Multiplanar multisequence MRI of the brain was performed without the administration of intravenous contrast. COMPARISON: Head CT dated 03/13/2022 HISTORY: ORDERING SYSTEM PROVIDED HISTORY: recurrent neurological symptoms with prior history of strokes TECHNOLOGIST PROVIDED HISTORY: Reason for exam:->recurrent neurological symptoms with prior history of strokes What is the sedation requirement?->None Decision Support Exception - unselect if not a suspected or confirmed emergency medical condition->Emergency Medical Condition (MA) FINDINGS: INTRACRANIAL STRUCTURES/VENTRICLES: There is an area of restricted diffusion in the right basal ganglia extending superiorly into the right posterior frontal periventricular white matter/corona radiata, consistent with acute/subacute infarct. No other acute infarct is seen. There is an old infarct involving the left basal ganglia and caudate head as well as small old right basal ganglia lacunar infarcts.   Multiple small areas of increased T2 signal are seen in the deep white matter bilaterally, consistent with chronic small vessel ischemic disease. No mass effect or midline shift. No evidence of an acute intracranial hemorrhage. The ventricles and sulci are normal in size and configuration. The sellar/suprasellar regions appear unremarkable. The normal signal voids within the major intracranial vessels appear maintained. ORBITS: The visualized portion of the orbits demonstrate no acute abnormality. SINUSES: There is a left mastoid effusion that could be associated with acute or chronic inflammatory process. The visualized paranasal sinuses and right mastoid air cells demonstrate no acute abnormality. BONES/SOFT TISSUES: The bone marrow signal intensity appears normal. The soft tissues demonstrate no acute abnormality. 1. Findings consistent with an acute/subacute infarct involving the right basal ganglia and extending up into the right frontal corona radiata. 2. Old infarct involving the left caudate head and basal ganglia and small old right basal ganglia lacunar infarcts. Chronic white matter ischemic changes. 3. Left mastoid effusion. US CAROTID ARTERY BILATERAL    Result Date: 3/13/2022  EXAMINATION: ULTRASOUND EVALUATION OF THE CAROTID ARTERIES 3/13/2022 TECHNIQUE: Duplex ultrasound using B-mode/gray scaled imaging, Doppler spectral analysis and color flow Doppler was obtained of the carotid arteries. COMPARISON: None. HISTORY: ORDERING SYSTEM PROVIDED HISTORY: stroke TECHNOLOGIST PROVIDED HISTORY: Reason for exam:->stroke What reading provider will be dictating this exam?->CRC FINDINGS: RIGHT: The right common carotid artery demonstrates peak systolic velocities of 57 cm/sec in the proximal and distal segments respectively. The right internal carotid artery demonstrates the systolic velocities of 62 cm/sec in the proximal, mid and distal segments respectively. The external carotid artery is patent.   The vertebral artery demonstrates normal antegrade flow. ICA/CCA ratio of 1.1 LEFT: The left common carotid artery demonstrates peak systolic velocities of 38 cm/sec in the proximal and distal segments respectively. The left internal carotid artery demonstrates the systolic velocities of 56 cm/sec in the proximal, mid and distal segments respectively. The external carotid artery is patent. The vertebral artery demonstrates normal antegrade flow. ICA/CCA ratio of 1.5     The right internal carotid artery demonstrates 0-50% stenosis. The left internal carotid artery demonstrates 0-50% stenosis. Bilateral vertebral arteries are patent with flow in the normal direction. RECOMMENDATIONS: Unavailable         HOSPITAL COURSE:   Deborah did well throughout hospitalization. She was admitted yesterday with strokelike symptoms and MRI confirmed both acute/subacute and old infarctions. She was markedly hypertensive on admission and, with decrease in blood pressure without directed therapy her stroke symptoms recurred and worsened. At that time, neurology recommendations were for permissive hypertension. Presently she remains markedly hypertensive but is neurological symptom-free. Carotid imaging did show moderate carotid stenosis and she understands importance of close outpatient follow-up and likely outpatient vascular referral.  We have instituted aspirin and Plavix with plans for dual therapy and then conversion to monotherapy as an outpatient. Statin has been added as well at a high dose. We have performed echo with bubble study that did not reveal any shunting to suggest cardioembolic phenomena. Her chronic morbidities are otherwise well managed. She understands importance of close outpatient follow-up.   She is acceptable for discharge home as discussed     BRIEF PHYSICAL EXAMINATION AND LABORATORIES ON DAY OF DISCHARGE:  VITALS:  BP (!) 189/106   Pulse 88   Temp 98.1 °F (36.7 °C) (Oral)   Resp 18   Ht 5' 1\" (1.549 m)   Wt 168 lb (76.2 kg)   LMP 11/18/2011   SpO2 95%   BMI 31.74 kg/m²     HEENT:  SRIDHAR. EOMI. Sclera clear. Buccal mucosa moist.    Neck:  Supple. Trachea midline. No thyromegaly. No JVD. No bruits. Heart:  Rhythm regular, rate controlled. S1 and S2.    Lungs:  Symmetrical. Clear to auscultation bilaterally. No wheezes. No rhonchi. No rales. Abdomen: Soft. Non-tender. Non-distended. Bowel sounds positive. No organomegaly or masses. No pain on palpation    Extremities:  Peripheral pulses present. No peripheral edema. No ulcers. Neurologic:  Alert x 3. No further speech changes or weakness, no focal deficit. Cranial nerves grossly intact. Skin:  No petechia. No hemorrhage. No wounds. DISPOSITION:  The patient's condition is good. At this time the patient is without objective evidence of an acute process requiring continuing hospitalization or inpatient management. They are stable for discharge with outpatient follow-up. I have spoken with the patient and discussed the results of the current hospitalization, in addition to providing specific details for the plan of care and counseling regarding the diagnosis and prognosis. The plan has been discussed in detail and they are aware of the specific conditions for emergent return, as well as the importance of follow-up.   Their questions are answered at this time and they are agreeable with the plan for discharge to home    DISCHARGE MEDICATIONS:   Current Discharge Medication List           Details   aspirin 81 MG chewable tablet Take 1 tablet by mouth daily  Qty: 30 tablet, Refills: 3      atorvastatin (LIPITOR) 80 MG tablet Take 1 tablet by mouth nightly  Qty: 30 tablet, Refills: 3      clopidogrel (PLAVIX) 75 MG tablet Take 1 tablet by mouth daily  Qty: 30 tablet, Refills: 3              Details   lisinopril (PRINIVIL;ZESTRIL) 10 MG tablet Take 1 tablet by mouth daily  Qty: 30 tablet, Refills: 3              Details   Ascorbic Acid (VITAMIN C) 500 MG tablet Take 500 mg by mouth daily      levothyroxine (SYNTHROID) 50 MCG tablet Take 50 mcg by mouth Daily      metoprolol (LOPRESSOR) 25 MG tablet Take 25 mg by mouth 2 times daily. FOLLOW UP/INSTRUCTIONS:  · This patient is instructed to follow-up with her primary care physician. · Patient is instructed to follow-up with the consults listed above as directed by them. · she is instructed to resume home medications and take new medications as indicated in the list above. · If the patient has a recurrence of symptoms, she is instructed to go to the ED. Preparing for this patient's discharge, including paperwork, orders, instructions, and meeting with patient did require > 40 minutes. JOSEPH Knight CNP   3/14/2022  7:51 AM       .I saw, examined, and discussed the patient with Gal MONROY and agree with his assessment and plan.     Electronically signed by Shay Head DO on 3/14/2022 at 12:35 PM

## 2022-03-14 NOTE — PROGRESS NOTES
[address] [facility]  [place]. [place]        Date:3/14/2022                                                  Patient Name: Giulia Vizcaino    MRN: [id number]    : 1965    Room: [facility]5771-47      Evaluating OT: Zaki Webb OTR/L; [id number]     Referring Provider and Specific Provider Orders/Date:      22 1230  OT eval and treat  Start:  22 1230,   End:  22 1230,   ONE TIME,   Standing Count:  1 Occurrences,   R         Sushant Rosenberg, DO     Placement Recommendation: Outpatient OT if needed to improve strength and coordination in L UE. Patient declining therapy at time of discharge from the hospital. Pt states her sister, Kosta Ortiz, is a nurse and can help her. Diagnosis:   1. Stroke-like symptoms         Surgery: none       Pertinent Medical History:       Past Medical History:   Diagnosis Date    Goiter     Hypertension     Thyroid disease          Past Surgical History:   Procedure Laterality Date    APPENDECTOMY      BREAST LUMPECTOMY      right,benign     SECTION      TOTAL THYROIDECTOMY  10    sub total thyroidectomy    TUBAL LIGATION        Precautions:  Fall Risk, CVA     MRI BRAIN WO CONTRAST 3/13/22  1. Findings consistent with an acute/subacute infarct involving the right basal ganglia and extending up into the right frontal corona radiata. 2. Old infarct involving the left caudate head and basal ganglia and small old right basal ganglia lacunar infarcts.  Chronic white matter ischemic changes. 3. Left mastoid effusion.       Assessment of current deficits:     [x] Functional mobility  [x]ADLs  [x] Strength               []Cognition    [x] Functional transfers   [x] IADLs         [] Safety Awareness   [x]Endurance    [] Fine Coordination              [x] Balance      [] Vision/perception   []Sensation     []Gross Motor Coordination  [] ROM  [] Delirium [] Motor Control     OT PLAN OF CARE   OT POC based on physician orders, patient diagnosis and results of clinical assessment    Frequency/Duration 1-3 days/wk for 2 weeks PRN     Specific OT Treatment Interventions to include:   * Instruction/training on adapted ADL techniques and AE recommendations to increase functional independence within precautions       * Training on energy conservation strategies, correct breathing pattern and techniques to improve independence/tolerance for self-care routine  * Functional transfer/mobility training/DME recommendations for increased independence, safety, and fall prevention  * Patient/Family education to increase follow through with safety techniques and functional independence  * Recommendation of environmental modifications for increased safety with functional transfers/mobility and ADLs  * Therapeutic exercise to improve motor endurance, ROM, and functional strength for ADLs/functional transfers  * Therapeutic activities to facilitate/challenge dynamic balance, stand tolerance for increased safety and independence with ADLs  * Positioning to improve skin integrity, interaction with environment and functional independence    Recommended Adaptive Equipment: none      Home Living: ; with boyfriend and 15year old granddaughter, 1 story, 2 steps to enter with no rail, tub shower. Equipment owned: none     Prior Level of Function: Independent with ADLs , Independent with IADLs; ambulated with no device    Pain Level: no pain; Nursing notified.       Cognition: A&O: 4/4; Follows 3 step directions   Memory: good    Sequencing: good    Problem solving: good    Judgement/safety: good     [facility]   AM-PAC Daily Activity Inpatient   How much help for putting on and taking off regular lower body clothing?: A Little  How much help for Bathing?: A Little  How much help for Toileting?: A Little  How much help for putting on and taking off regular upper body clothing?: A Little  How much help for taking care of personal grooming?: A Little  How much help for eating meals?: None  AM-PAC Inpatient Daily Activity Raw Score: 19  AM-PAC Inpatient ADL T-Scale Score : 40.22  ADL Inpatient CMS 0-100% Score: 42.8  ADL Inpatient CMS G-Code Modifier : CK     Functional Assessment:    Initial Eval Status  Date: 3/14/22 Treatment Status  Date: STGs = LTGs  Time frame: 10-14 days   Feeding Independent   Independent    Grooming Supervision   Independent    UB Dressing Supervision   Independent    LB Dressing Supervision   Independent    Bathing Supervision  Independent    Toileting Supervision   Independent    Bed Mobility  N/T as pt was up in chair and returned to bedside chair   Supine to sit: Independent   Sit to supine: Independent    Functional Transfers Supervision from bedside chair. Supervision for transfer to and from low commode with  Minimal verbal cues to use grab bar for safe commode transfer. Transfer training with verbal cues for hand placement throughout session to improve safety. Independent    Functional Mobility Supervision with no device to improve balance to and from bathroom and within the room, verbal cues for walker sequence and safety. Independent    Balance Sitting:     Static: good     Dynamic: good   Standing: fair plus with no device     Activity Tolerance Fair plus  good    Visual/  Perceptual Glasses: yes   Pt declines visual deficits or diplopia. Hand Dominance: left      AROM (PROM) Strength Additional Info:    RUE  WFL 5/5 good  and wfl FMC/dexterity noted during ADL tasks     LUE WFL 4/5 Fair plus  and decreased coordination in L hand     Hearing: WFL   Sensation:  No c/o numbness or tingling  Tone: WFL   Edema: none     Comments: Upon arrival the patient was seated in bedside chair. At end of session, patient was returned to bedside chair with call light and phone within reach, all lines and tubes intact.   Overall patient demonstrated decreased independence and safety during completion of ADL/functional transfer/mobility tasks. Pt would benefit from continued skilled OT to increase safety and independence with completion of ADL/IADL tasks for functional independence and quality of life. Treatment: OT treatment provided this date includes:    Instruction/training on safety and adapted techniques for completion of ADLs    Instruction/training on safe functional mobility/transfer techniques    Instruction/training on energy conservation/work simplification for completion of ADLs    Proper Positioning/Alignment    Coordination tests were completed - impaired rapid alternating movements and intact finger opposition in L UE. Rehab Potential: Good for established goals. Patient / Family Goal: return home today      Patient and/or family were instructed on functional diagnosis, prognosis/goals and OT plan of care. Demonstrated good understanding. Eval Complexity: Low    Time In: 1:00pm   Time Out: 1:28pm    Total Treatment Time: 8      Min Units   OT Eval Low 97165  X  1    OT Eval Medium 14275      OT Eval High 54006      OT Re-Eval [id number]            ADL/Self Care 66750     Therapeutic Activities 00480  8   1    Therapeutic Ex 53828       Orthotic Management 08549       Manual [id number]     Neuro Re-Ed 62624       Non-Billable Time        Evaluation Time additionally includes thorough review of current medical information, gathering information on past medical history/social history and prior level of function, interpretation of standardized testing/informal observation of tasks, assessment of data and development of plan of care and goals.         Evaluating OT: Juarez Clark OTR/L; [id number]

## 2022-03-14 NOTE — PROGRESS NOTES
Date:3/14/2022  Patient Name: Ty Vila  MRN: [id number]  : 1965  ROOM #: [id number]    Occupational Therapy order received, chart reviewed and evaluation attempted this date. Patient is unavailable for OT evaluation due to bring off the unit for an ECHO. Will attempt OT evaluation at a later time. Thank you.    Kaia Arreguin OTR/L #[id number]

## 2022-03-14 NOTE — PLAN OF CARE
Problem: COMMUNICATION IMPAIRMENT  Goal: Ability to express needs and understand communication  Outcome: Met This Shift     Problem: ACTIVITY INTOLERANCE/IMPAIRED MOBILITY  Goal: Mobility/activity is maintained at optimum level for patient  Outcome: Met This Shift

## 2022-03-14 NOTE — CARE COORDINATION
3-14-Cm note: ( no covid testing) met with pt for transition of care needs, pt is independent, her boyfriend lives with her as well as her 15year old grandson. Pt denies any needs for dc including home health care. Pt's boyfriend will provide transport home.  Electronically signed by Nimco Saba RN on 3/14/2022 at 1:53 PM

## 2022-03-14 NOTE — PROGRESS NOTES
Physical Therapy  Physical Therapy Treatment Note/Plan of Care    Room #:  4950/2250-27  Patient Name: Bianca Garcia  YOB: 1965  MRN: [id number]    Date of Service: 3/14/2022     Tentative placement recommendation: Home Health Physical Therapy   Equipment recommendation: To be determined      Evaluating Physical Therapist: Ozzie Castellanos Physical Therapist      Specific Provider Orders/Date/Referring Provider : 03/13/22 1230   PT evaluation and treat Start: 03/13/22 1230, End: 03/13/22 1230, ONE TIME, Standing Count: 1 Occurrences, R    Thomas Falcon DO      Admitting Diagnosis:   Acute CVA (cerebrovascular accident) [facility]) [I63.9]      Surgery: none   admitted with left sided weakness    Patient Active Problem List   Diagnosis    Multinodular goiter    Hyperthyroidism    Dysphagia    Acute CVA [facility]cerebrovascular accident) ([facility] [address]    Stroke-like symptoms        ASSESSMENT of Current Deficits Patient exhibits decreased strength, balance and endurance impairing functional mobility, gait  and gait distance. Patient reports she feels back at baseline. Patient performed ambulation and transfers with supervision. The patient will benefit from continued skilled therapy to increase strength and improve balance for safe functional mobility, to decrease risk of falls, and to meet goals at discharge.         PHYSICAL THERAPY  PLAN OF CARE       Physical therapy plan of care is established based on physician order,  patient diagnosis and clinical assessment    Current Treatment Recommendations:    -Standing Balance: Perform strengthening exercises in standing to promote motor control with or without upper extremity support  and Perform sit to stand activities maintaining FWB (full weight bearing) weightbearing left lower extremity   -Transfers: Provide instruction on proper hand and foot position for adequate transfer of weight onto lower extremities and use of gait device if needed and Cues for hand placement, technique and safety. Provide stabilization to prevent fall   -Gait: Gait training, Standing activities to improve: base of support, weight shift, weight bearing  and Exercises to improve trunk control   -Endurance: Utilize Supervised activities to increase level of endurance to allow for safe functional mobility including transfers and gait   -Stairs: Stair training with instruction on proper technique and hand placement on rail to maintain FWB (full weight bearing) weightbearing Bilateral     PT long term treatment goals are located in below grid    Patient and or family understand(s) diagnosis, prognosis, and plan of care. Frequency of treatments: Patient will be seen  daily.          Prior Level of Function: Patient ambulated independently   Rehab Potential: good for baseline    Past medical history:   Past Medical History:   Diagnosis Date    Goiter     Hypertension     Thyroid disease      Past Surgical History:   Procedure Laterality Date    APPENDECTOMY      BREAST LUMPECTOMY      right,benign     SECTION      TOTAL THYROIDECTOMY  10    sub total thyroidectomy    TUBAL LIGATION         SUBJECTIVE:    Precautions: Up as tolerated, NPO, Acute CVA affecting left side  Social history: Patient lives with boyfriend in a ranch home  with 2 steps  to enter without Rail  Tub shower     Equipment owned: None    [facility]   How much difficulty turning over in bed?: None  How much difficulty sitting down on / standing up from a chair with arms?: A Little  How much difficulty moving from lying on back to sitting on side of bed?: None  How much help from another person moving to and from a bed to a chair?: A Little  How much help from another person needed to walk in hospital room?: A Little  How much help from another person for climbing 3-5 steps with a railing?: A Little  AM-PAC Inpatient Mobility Raw Score : 20  AM-PAC Inpatient T-Scale Score : 47.67  Mobility Inpatient CMS 0-100% Score: 35.83  Mobility Inpatient CMS G-Code Modifier : [address] cleared patient for PT treatment. .   OBJECTIVE;   Initial Evaluation  Date: 3/13/2022 Treatment Date:    3/14/2022   Short Term/ Long Term   Goals   Was pt agreeable to Eval/treatment? Yes Yes To be met in 4 days   Pain level   0/10   0/10    Bed Mobility    Rolling: Supervision     Supine to sit: Supervision     Sit to supine: Supervision     Scooting: Supervision    Rolling: Independent   Supine to sit: Independent   Sit to supine: Independent   Scooting: Independent    Rolling: Independent    Supine to sit:  Independent    Sit to supine: Independent    Scooting: Supervision      Transfers Sit to stand: Supervision   Sit to stand: Supervision       Sit to stand: Independent    Ambulation    75 feet using  no device with Minimal assist of 1   cues for sequencing, upright posture and safety 150 feet using  no device with Supervision    cues for safety and pacing    150 feet using  no device with Independent    Stair negotiation: ascended and descended   Not assessed     2 steps no rail independent   ROM Within functional limits    Increase range of motion 10% of affected joints    Strength BUE:  4+/5 RUE, 4/5 LUE  RLE:  4+/5  LLE:  4/5  Increase strength in affected mm groups by 1/3 grade   Balance Sitting EOB:  good -  Dynamic Standing:  fair + Sitting EOB: good   Dynamic Standing: good -    Sitting EOB:  good   Dynamic Standing: good      Patient is Alert & Oriented x person, place, time and situation and follows directions    Sensation:  Patient  denies numbness/tingling   Edema:  no   Endurance: fair  +    Vitals: room air   Blood Pressure at rest  Blood Pressure during session    Heart Rate at rest  Heart Rate during session    SPO2 at rest %  SPO2 during session 95%     Patient education  Patient educated on role of Physical Therapy, risks of immobility, safety and plan of care, energy conservation,  importance of mobility while in hospital , safety  and stair training      Patient response to education:   Pt verbalized understanding Pt demonstrated skill Pt requires further education in this area   Yes Partial Yes      Treatment:  Patient practiced and was instructed/facilitated in the following treatment: Patient ambulated in hallway with challenges for following directions, as well as head turning for balance challenges. Patient assisted to chair in room      Therapeutic Exercises:  Reaching outside base of support, sitting balance challenges, rapid alternating movements  x 5 reps. At end of session, patient in chair with alarm call light and phone within reach,  all lines and tubes intact, nursing notified. Patient would benefit from continued skilled Physical Therapy to improve functional independence and quality of life.          Patient's/ family goals   home    Time in  18  Time out  0845    Total Treatment Time  14 minutes     CPT codes:  Therapeutic activities (74028)   14 minutes  1 unit(s)    Shayy Dya [place] #[id number]

## 2022-04-02 ENCOUNTER — APPOINTMENT (OUTPATIENT)
Dept: ULTRASOUND IMAGING | Age: 57
End: 2022-04-02

## 2022-04-02 ENCOUNTER — APPOINTMENT (OUTPATIENT)
Dept: GENERAL RADIOLOGY | Age: 57
End: 2022-04-02

## 2022-04-02 ENCOUNTER — HOSPITAL ENCOUNTER (EMERGENCY)
Age: 57
Discharge: HOME | End: 2022-04-02
Attending: EMERGENCY MEDICINE

## 2022-04-02 ENCOUNTER — APPOINTMENT (OUTPATIENT)
Dept: CT IMAGING | Age: 57
End: 2022-04-02

## 2022-04-02 VITALS
RESPIRATION RATE: 28 BRPM | OXYGEN SATURATION: 98 % | DIASTOLIC BLOOD PRESSURE: 136 MMHG | HEART RATE: 74 BPM | BODY MASS INDEX: 45.06 KG/M2 | TEMPERATURE: 97.8 F | WEIGHT: 238.5 LBS | SYSTOLIC BLOOD PRESSURE: 185 MMHG

## 2022-04-02 DIAGNOSIS — R51.9 NONINTRACTABLE HEADACHE, UNSPECIFIED CHRONICITY PATTERN, UNSPECIFIED HEADACHE TYPE: ICD-10-CM

## 2022-04-02 DIAGNOSIS — I10 HYPERTENSION, UNSPECIFIED TYPE: Primary | ICD-10-CM

## 2022-04-02 LAB
ANION GAP SERPL CALCULATED.3IONS-SCNC: 10 MMOL/L (ref 7–16)
BASOPHILS ABSOLUTE: 0.05 E9/L (ref 0–0.2)
BASOPHILS RELATIVE PERCENT: 0.6 % (ref 0–2)
BUN BLDV-MCNC: 7 MG/DL (ref 6–20)
CALCIUM SERPL-MCNC: 8.9 MG/DL (ref 8.6–10.2)
CHLORIDE BLD-SCNC: 104 MMOL/L (ref 98–107)
CO2: 29 MMOL/L (ref 22–29)
CREAT SERPL-MCNC: 0.6 MG/DL (ref 0.5–1)
EOSINOPHILS ABSOLUTE: 0.18 E9/L (ref 0.05–0.5)
EOSINOPHILS RELATIVE PERCENT: 2 % (ref 0–6)
GFR AFRICAN AMERICAN: >60
GFR NON-AFRICAN AMERICAN: >60 ML/MIN/1.73
GLUCOSE BLD-MCNC: 93 MG/DL (ref 74–99)
HCT VFR BLD CALC: 40.4 % (ref 34–48)
HEMOGLOBIN: 12.7 G/DL (ref 11.5–15.5)
IMMATURE GRANULOCYTES #: 0.04 E9/L
IMMATURE GRANULOCYTES %: 0.5 % (ref 0–5)
LYMPHOCYTES ABSOLUTE: 2.43 E9/L (ref 1.5–4)
LYMPHOCYTES RELATIVE PERCENT: 27.5 % (ref 20–42)
MCH RBC QN AUTO: 27.4 PG (ref 26–35)
MCHC RBC AUTO-ENTMCNC: 31.4 % (ref 32–34.5)
MCV RBC AUTO: 87.3 FL (ref 80–99.9)
MONOCYTES ABSOLUTE: 0.46 E9/L (ref 0.1–0.95)
MONOCYTES RELATIVE PERCENT: 5.2 % (ref 2–12)
NEUTROPHILS ABSOLUTE: 5.68 E9/L (ref 1.8–7.3)
NEUTROPHILS RELATIVE PERCENT: 64.2 % (ref 43–80)
PDW BLD-RTO: 14.5 FL (ref 11.5–15)
PLATELET # BLD: 285 E9/L (ref 130–450)
PMV BLD AUTO: 9.9 FL (ref 7–12)
POTASSIUM REFLEX MAGNESIUM: 3.8 MMOL/L (ref 3.5–5)
PRO-BNP: 391 PG/ML (ref 0–125)
RBC # BLD: 4.63 E12/L (ref 3.5–5.5)
SODIUM BLD-SCNC: 143 MMOL/L (ref 132–146)
TROPONIN, HIGH SENSITIVITY: <6 NG/L (ref 0–9)
WBC # BLD: 8.8 E9/L (ref 4.5–11.5)

## 2022-04-02 PROCEDURE — 80048 BASIC METABOLIC PNL TOTAL CA: CPT

## 2022-04-02 PROCEDURE — 83880 ASSAY OF NATRIURETIC PEPTIDE: CPT

## 2022-04-02 PROCEDURE — 70450 CT HEAD/BRAIN W/O DYE: CPT

## 2022-04-02 PROCEDURE — 85025 COMPLETE CBC W/AUTO DIFF WBC: CPT

## 2022-04-02 PROCEDURE — 71045 X-RAY EXAM CHEST 1 VIEW: CPT

## 2022-04-02 PROCEDURE — 36415 COLL VENOUS BLD VENIPUNCTURE: CPT

## 2022-04-02 PROCEDURE — 96375 TX/PRO/DX INJ NEW DRUG ADDON: CPT

## 2022-04-02 PROCEDURE — 99285 EMERGENCY DEPT VISIT HI MDM: CPT

## 2022-04-02 PROCEDURE — 93005 ELECTROCARDIOGRAM TRACING: CPT | Performed by: EMERGENCY MEDICINE

## 2022-04-02 PROCEDURE — 6360000002 HC RX W HCPCS: Performed by: EMERGENCY MEDICINE

## 2022-04-02 PROCEDURE — 96374 THER/PROPH/DIAG INJ IV PUSH: CPT

## 2022-04-02 PROCEDURE — 84484 ASSAY OF TROPONIN QUANT: CPT

## 2022-04-02 PROCEDURE — 93970 EXTREMITY STUDY: CPT

## 2022-04-02 RX ORDER — KETOROLAC TROMETHAMINE 30 MG/ML
30 INJECTION, SOLUTION INTRAMUSCULAR; INTRAVENOUS ONCE
Status: COMPLETED | OUTPATIENT
Start: 2022-04-02 | End: 2022-04-02

## 2022-04-02 RX ORDER — DIPHENHYDRAMINE HYDROCHLORIDE 50 MG/ML
50 INJECTION INTRAMUSCULAR; INTRAVENOUS ONCE
Status: COMPLETED | OUTPATIENT
Start: 2022-04-02 | End: 2022-04-02

## 2022-04-02 RX ORDER — METOCLOPRAMIDE HYDROCHLORIDE 5 MG/ML
10 INJECTION INTRAMUSCULAR; INTRAVENOUS ONCE
Status: COMPLETED | OUTPATIENT
Start: 2022-04-02 | End: 2022-04-02

## 2022-04-02 RX ADMIN — DIPHENHYDRAMINE HYDROCHLORIDE 50 MG: 50 INJECTION, SOLUTION INTRAMUSCULAR; INTRAVENOUS at 12:58

## 2022-04-02 RX ADMIN — METOCLOPRAMIDE 10 MG: 5 INJECTION, SOLUTION INTRAMUSCULAR; INTRAVENOUS at 12:58

## 2022-04-02 RX ADMIN — KETOROLAC TROMETHAMINE 30 MG: 30 INJECTION, SOLUTION INTRAMUSCULAR; INTRAVENOUS at 14:55

## 2022-04-02 ASSESSMENT — PAIN DESCRIPTION - DESCRIPTORS: DESCRIPTORS: HEADACHE

## 2022-04-02 ASSESSMENT — PAIN DESCRIPTION - LOCATION: LOCATION: HEAD

## 2022-04-02 ASSESSMENT — PAIN DESCRIPTION - ORIENTATION: ORIENTATION: MID

## 2022-04-02 ASSESSMENT — ENCOUNTER SYMPTOMS
SHORTNESS OF BREATH: 0
VOMITING: 0
EYE REDNESS: 0
ABDOMINAL PAIN: 0
NAUSEA: 0

## 2022-04-02 ASSESSMENT — PAIN SCALES - GENERAL
PAINLEVEL_OUTOF10: 3
PAINLEVEL_OUTOF10: 9

## 2022-04-02 NOTE — ED PROVIDER NOTES
Chief complaint: Headache      HPI:  4/2/22, Time: 12:09 PM EDT    HPI                Allyn Lou is a 60 y.o. female presenting to the ED for headache. The history is obtained from the patient as well as the patient's medical record. Patient was recently admitted and found to have an acute stroke. During that admission the patient was profoundly hypertensive and her neurologic symptoms did worsen during episodes of normotension. The patient did have neurology consultation and permissive hypertension was recommended. Patient was discharged home. The patient she takes lisinopril as well as metoprolol for hypertension which she did take today. She is presenting today for a week and a half headache. States this has been intermittent. The headache located in the frontal region. Does wax and wane with no particular aggravating or alleviating factors. No treatment prior to arrival.  Headache is currently rated at 9 out of 10. She denies any numbness weakness or paresthesias. She denies any visual disturbances. She denies any fevers, neck pain or neck stiffness. Patient does also endorse mild lower extremity edema. ROS:   Review of Systems   Constitutional: Negative for chills and fatigue. HENT: Negative for congestion. Eyes: Negative for redness. Respiratory: Negative for shortness of breath. Cardiovascular: Positive for leg swelling. Negative for chest pain. Gastrointestinal: Negative for abdominal pain, nausea and vomiting. Genitourinary: Negative for dysuria. Musculoskeletal: Negative for arthralgias. Skin: Negative for rash. Neurological: Positive for headaches. Negative for light-headedness. Psychiatric/Behavioral: Negative for confusion.    All other systems reviewed and are negative.      --------------------------------------------- PAST HISTORY ---------------------------------------------  Past Medical History:  has a past medical history of Goiter, Hypertension, and Thyroid disease. Past Surgical History:  has a past surgical history that includes Appendectomy;  section; Tubal ligation; Breast lumpectomy; and Total Thyroidectomy (12). Social History:  reports that she has been smoking cigarettes. She has a 20.00 pack-year smoking history. She has never used smokeless tobacco. She reports that she does not drink alcohol and does not use drugs. Family History: family history is not on file. The patients home medications have been reviewed. Allergies: Vicodin [hydrocodone-acetaminophen]    ---------------------------------------------------PHYSICAL EXAM--------------------------------------      Constitutional/General: Alert and oriented x3, well appearing, non toxic in NAD  Head: Normocephalic and atraumatic  Mouth: Oropharynx clear, handling secretions, no trismus  Neck: Supple, full ROM, no meningeal sign  Pulmonary: Lungs clear to auscultation bilaterally, no wheezes, rales, or rhonchi. Not in respiratory distress  Cardiovascular:  Regular rate. Regular rhythm. No murmurs  Chest: no chest wall tenderness  Abdomen: Soft. Non tender. Non distended. No rebound, guarding, or rigidity. No pulsatile masses appreciated. Musculoskeletal: Moves all extremities x 4. Warm and well perfused, no clubbing, cyanosis, or edema. Capillary refill <3 seconds  Skin: warm and dry. No rashes. Neurologic: GCS 15, CN 2-12 grossly intact, no focal deficits, symmetric strength 5/5 in the upper and lower extremities bilaterally. Speech normal. Normal finger to nose. No drift. Psych: Normal Affect    -------------------------------------------------- RESULTS -------------------------------------------------  I have personally reviewed all laboratory and imaging results for this patient. Results are listed below.      LABS:  Results for orders placed or performed during the hospital encounter of 22   CBC with Auto Differential   Result Value Ref Range WBC 8.8 4.5 - 11.5 E9/L    RBC 4.63 3.50 - 5.50 E12/L    Hemoglobin 12.7 11.5 - 15.5 g/dL    Hematocrit 40.4 34.0 - 48.0 %    MCV 87.3 80.0 - 99.9 fL    MCH 27.4 26.0 - 35.0 pg    MCHC 31.4 (L) 32.0 - 34.5 %    RDW 14.5 11.5 - 15.0 fL    Platelets 082 290 - 434 E9/L    MPV 9.9 7.0 - 12.0 fL    Neutrophils % 64.2 43.0 - 80.0 %    Immature Granulocytes % 0.5 0.0 - 5.0 %    Lymphocytes % 27.5 20.0 - 42.0 %    Monocytes % 5.2 2.0 - 12.0 %    Eosinophils % 2.0 0.0 - 6.0 %    Basophils % 0.6 0.0 - 2.0 %    Neutrophils Absolute 5.68 1.80 - 7.30 E9/L    Immature Granulocytes # 0.04 E9/L    Lymphocytes Absolute 2.43 1.50 - 4.00 E9/L    Monocytes Absolute 0.46 0.10 - 0.95 E9/L    Eosinophils Absolute 0.18 0.05 - 0.50 E9/L    Basophils Absolute 0.05 0.00 - 0.20 W1/T   Basic Metabolic Panel w/ Reflex to MG   Result Value Ref Range    Sodium 143 132 - 146 mmol/L    Potassium reflex Magnesium 3.8 3.5 - 5.0 mmol/L    Chloride 104 98 - 107 mmol/L    CO2 29 22 - 29 mmol/L    Anion Gap 10 7 - 16 mmol/L    Glucose 93 74 - 99 mg/dL    BUN 7 6 - 20 mg/dL    CREATININE 0.6 0.5 - 1.0 mg/dL    GFR Non-African American >60 >=60 mL/min/1.73    GFR African American >60     Calcium 8.9 8.6 - 10.2 mg/dL   Troponin   Result Value Ref Range    Troponin, High Sensitivity <6 0 - 9 ng/L   Brain Natriuretic Peptide   Result Value Ref Range    Pro- (H) 0 - 125 pg/mL   EKG 12 Lead   Result Value Ref Range    Ventricular Rate 79 BPM    Atrial Rate 79 BPM    P-R Interval 112 ms    QRS Duration 90 ms    Q-T Interval 412 ms    QTc Calculation (Bazett) 472 ms    P Axis 74 degrees    R Axis 73 degrees    T Axis 7 degrees       RADIOLOGY:  Interpreted by Radiologist.  US DUP LOWER EXTREMITIES BILATERAL VENOUS   Final Result   No evidence of DVT in either lower extremity. CT HEAD WO CONTRAST   Final Result   1. No acute intracranial abnormality. Specifically, there is no acute   intracranial hemorrhage      2.   Old periventricular white matter infarct adjacent to the anterior horn of   the left lateral ventricle      3. Old infarct within the head of the caudate nucleus      4. Old lacunar infarct within the right basal ganglia. XR CHEST PORTABLE   Final Result   1. There are no findings of failure or pneumonia   2. Linear scarring within the right lung base   3. Discoid atelectasis and adjacent linear scar seen within the left   perihilar region. EKG:  This EKG is signed and interpreted by me. Normal sinus rhythm rate of 79, no ST segment elevation or depression, CA interval 112 MS, QRS 90 MS,  MS  Interpreted by me      ------------------------- NURSING NOTES AND VITALS REVIEWED ---------------------------   The nursing notes within the ED encounter and vital signs as below have been reviewed by myself. BP (!) 185/136   Pulse 74   Temp 97.8 °F (36.6 °C) (Infrared)   Resp 28   Wt 238 lb 8 oz (108.2 kg)   LMP 11/18/2011   SpO2 98%   BMI 45.06 kg/m²   Oxygen Saturation Interpretation: Normal    The patients available past medical records and past encounters were reviewed. ------------------------------ ED COURSE/MEDICAL DECISION MAKING----------------------  Medications   metoclopramide (REGLAN) injection 10 mg (10 mg IntraVENous Given 4/2/22 1258)   diphenhydrAMINE (BENADRYL) injection 50 mg (50 mg IntraVENous Given 4/2/22 1258)   ketorolac (TORADOL) injection 30 mg (30 mg IntraVENous Given 4/2/22 1455)             Medical Decision Making:   I, Dr. Mini Loya am the primary physician of record. Clarice Jean is a 60 y.o. female who presents to the ED for headache and hypertension. The patient did have labs and imaging which were reviewed. Work-up reassuring. Patient with no meningeal signs. No neurologic deficits on examination. Per review of the patient's note she is to be experiencing permissive hypertension.   She is instructed to follow-up with her primary care physician. Re-Evaluations/Consultations:           Results were discussed. The patient has no headache at this time. She will be discharged               This patient's ED course included: History, physical examination, reevaluation prior to disposition, labs, imaging, IV medications    This patient has remained hemodynamically stable during their ED course. Counseling: The emergency provider has spoken with the patient and discussed todays results, in addition to providing specific details for the plan of care and counseling regarding the diagnosis and prognosis. Questions are answered at this time and they are agreeable with the plan.       --------------------------------- IMPRESSION AND DISPOSITION ---------------------------------    IMPRESSION  1. Hypertension, unspecified type    2. Nonintractable headache, unspecified chronicity pattern, unspecified headache type        DISPOSITION  Disposition: Discharge to home  Patient condition is stable        NOTE: This report was transcribed using voice recognition software.  Every effort was made to ensure accuracy; however, inadvertent computerized transcription errors may be present         Eddie Darling DO  04/02/22 1802

## 2022-04-03 LAB
EKG ATRIAL RATE: 79 BPM
EKG P AXIS: 74 DEGREES
EKG P-R INTERVAL: 112 MS
EKG Q-T INTERVAL: 412 MS
EKG QRS DURATION: 90 MS
EKG QTC CALCULATION (BAZETT): 472 MS
EKG R AXIS: 73 DEGREES
EKG T AXIS: 7 DEGREES
EKG VENTRICULAR RATE: 79 BPM

## 2025-01-28 ENCOUNTER — HOSPITAL ENCOUNTER (EMERGENCY)
Age: 60
Discharge: HOME | End: 2025-01-29
Payer: COMMERCIAL

## 2025-01-28 ENCOUNTER — APPOINTMENT (OUTPATIENT)
Dept: CT IMAGING | Age: 60
End: 2025-01-28
Payer: COMMERCIAL

## 2025-01-28 DIAGNOSIS — S22.080A T12 COMPRESSION FRACTURE, INITIAL ENCOUNTER (HCC): Primary | ICD-10-CM

## 2025-01-28 DIAGNOSIS — S39.012A STRAIN OF LUMBAR REGION, INITIAL ENCOUNTER: ICD-10-CM

## 2025-01-28 PROCEDURE — 72131 CT LUMBAR SPINE W/O DYE: CPT

## 2025-01-28 PROCEDURE — 6370000000 HC RX 637 (ALT 250 FOR IP)

## 2025-01-28 PROCEDURE — 99284 EMERGENCY DEPT VISIT MOD MDM: CPT

## 2025-01-28 PROCEDURE — 6360000002 HC RX W HCPCS

## 2025-01-28 PROCEDURE — 96372 THER/PROPH/DIAG INJ SC/IM: CPT

## 2025-01-28 RX ORDER — OXYCODONE AND ACETAMINOPHEN 5; 325 MG/1; MG/1
1 TABLET ORAL ONCE
Status: COMPLETED | OUTPATIENT
Start: 2025-01-28 | End: 2025-01-28

## 2025-01-28 RX ORDER — LIDOCAINE 4 G/G
1 PATCH TOPICAL ONCE
Status: DISCONTINUED | OUTPATIENT
Start: 2025-01-28 | End: 2025-01-29 | Stop reason: HOSPADM

## 2025-01-28 RX ORDER — ORPHENADRINE CITRATE 30 MG/ML
60 INJECTION INTRAMUSCULAR; INTRAVENOUS ONCE
Status: COMPLETED | OUTPATIENT
Start: 2025-01-28 | End: 2025-01-28

## 2025-01-28 RX ORDER — KETOROLAC TROMETHAMINE 30 MG/ML
30 INJECTION, SOLUTION INTRAMUSCULAR; INTRAVENOUS ONCE
Status: COMPLETED | OUTPATIENT
Start: 2025-01-28 | End: 2025-01-28

## 2025-01-28 RX ADMIN — OXYCODONE AND ACETAMINOPHEN 1 TABLET: 5; 325 TABLET ORAL at 23:29

## 2025-01-28 RX ADMIN — KETOROLAC TROMETHAMINE 30 MG: 30 INJECTION, SOLUTION INTRAMUSCULAR at 23:26

## 2025-01-28 RX ADMIN — ORPHENADRINE CITRATE 60 MG: 60 INJECTION INTRAMUSCULAR; INTRAVENOUS at 23:27

## 2025-01-29 VITALS
SYSTOLIC BLOOD PRESSURE: 167 MMHG | DIASTOLIC BLOOD PRESSURE: 89 MMHG | WEIGHT: 168 LBS | HEART RATE: 73 BPM | TEMPERATURE: 97.2 F | OXYGEN SATURATION: 95 % | BODY MASS INDEX: 31.74 KG/M2 | RESPIRATION RATE: 16 BRPM

## 2025-01-29 PROCEDURE — 6370000000 HC RX 637 (ALT 250 FOR IP)

## 2025-01-29 RX ORDER — OXYCODONE AND ACETAMINOPHEN 5; 325 MG/1; MG/1
1 TABLET ORAL EVERY 6 HOURS PRN
Qty: 12 TABLET | Refills: 0 | Status: SHIPPED | OUTPATIENT
Start: 2025-01-29 | End: 2025-02-01

## 2025-01-29 RX ORDER — LIDOCAINE 4 G/G
1 PATCH TOPICAL DAILY
Qty: 30 PATCH | Refills: 0 | Status: SHIPPED | OUTPATIENT
Start: 2025-01-29 | End: 2025-02-28

## 2025-01-29 NOTE — CARE COORDINATION
1/29/24   0938   note: Pt seen here last night for T12 compression fracture. SW received call from [facility]. She noted pt needs TLSO brace. She needs  to sign off statement of medical necessity which she will fax to CAESAR. She needs doc note & face sheet. CAESAR reviewed chart & there is no order for brace, but Suzanne noted pt is to bring one in. CAESAR will have  sign form the next time he/she works. CAESAR did fax Suzanne face sheet & doc note.   Electronically signed by ARLETH Georges on 1/29/2025 at 10:01 AM    Addendum  1545  Call from Suzanne [facility]. She notes pt did not show up for her appointment today. Suzanne called her and left message for pt- to see if she want the brace. Until pt contacts Suzanne, there will be no need for medial necessity form. If CAESAR does not hear back from Suzanne- case will be closed.  Electronically signed by ARLETH Georges on 1/29/2025 at 3:48 PM

## 2025-01-29 NOTE — ED PROVIDER NOTES
Independent ASHA Visit     [facility]  Department of Emergency Medicine   ED  Encounter Note  Admit Date/RoomTime: 2025 11:00 PM  ED Room:     NAME: Cinthia Becerra  : 1965  MRN: [id number]     Chief Complaint:  Back Pain (Low back pain after picking up 50 pound bag of dog food 3 days ago, radiates down both legs, no dysuria)    History of Present Illness   History provided by the patient and chart review.    Cinthia Becerra is a 59 y.o. old female who presents to the emergency department by private vehicle, for traumatic acute, aching and sharp bilateral, paraspinal lower lumbar spine pain without radiation, for 3 day(s) prior to arrival after picking up a 50 lb bag of dog food.   Since onset the symptoms have been gradually worsening and is moderate-to-severe.  She has associated signs & symptoms of nothing additional.   She denies any bladder or bowel incontinence , new weakness, tingling or paresthesias, recent back injection, recent spinal surgery, recent spinal/chiropractic manipulation, history of IVDA, fever, abdominal pain, bladder retention, bladder urgency, bowel urgency, saddle paresthesias , sacral numbness, burning, numbness, tingling, or UTI symptoms.   The pain is aggraveated by any movement and relieved by nothing despite medication use and rest.     ROS   Pertinent positives and negatives are stated within HPI, all other systems reviewed and are negative.    Past Medical History:  has a past medical history of Goiter, Hypertension, and Thyroid disease.    Surgical History:  has a past surgical history that includes Appendectomy;  section; Tubal ligation; Breast lumpectomy; and Total Thyroidectomy (12).    Social History:  reports that she has been smoking cigarettes. She has a 20 pack-year smoking history. She has never used smokeless tobacco. She reports that she does not drink alcohol and does not use drugs.    Family History: family history is

## 2025-05-11 ENCOUNTER — HOSPITAL ENCOUNTER (INPATIENT)
Age: 60
LOS: 1 days | Discharge: ANOTHER HOSPITAL | End: 2025-05-12
Attending: EMERGENCY MEDICINE | Admitting: INTERNAL MEDICINE
Payer: COMMERCIAL

## 2025-05-11 ENCOUNTER — APPOINTMENT (OUTPATIENT)
Dept: GENERAL RADIOLOGY | Age: 60
End: 2025-05-11
Payer: COMMERCIAL

## 2025-05-11 ENCOUNTER — APPOINTMENT (OUTPATIENT)
Dept: MRI IMAGING | Age: 60
End: 2025-05-11
Payer: COMMERCIAL

## 2025-05-11 ENCOUNTER — APPOINTMENT (OUTPATIENT)
Dept: CT IMAGING | Age: 60
End: 2025-05-11
Payer: COMMERCIAL

## 2025-05-11 DIAGNOSIS — I63.9 CEREBROVASCULAR ACCIDENT (CVA), UNSPECIFIED MECHANISM (HCC): ICD-10-CM

## 2025-05-11 DIAGNOSIS — R41.82 ALTERED MENTAL STATUS, UNSPECIFIED ALTERED MENTAL STATUS TYPE: ICD-10-CM

## 2025-05-11 DIAGNOSIS — R51.9 ACUTE NONINTRACTABLE HEADACHE, UNSPECIFIED HEADACHE TYPE: ICD-10-CM

## 2025-05-11 DIAGNOSIS — S06.5XAA SUBACUTE SUBDURAL HEMATOMA (HCC): Primary | ICD-10-CM

## 2025-05-11 LAB
ALBUMIN SERPL-MCNC: 3.7 G/DL (ref 3.5–5.2)
ALLEN TEST: POSITIVE
ALP SERPL-CCNC: 136 U/L (ref 35–104)
ALT SERPL-CCNC: 20 U/L (ref 0–32)
AMMONIA PLAS-SCNC: 18 UMOL/L (ref 11–51)
AMPHET UR QL SCN: NEGATIVE
ANION GAP SERPL CALCULATED.3IONS-SCNC: 12 MMOL/L (ref 7–16)
APAP SERPL-MCNC: <5 UG/ML (ref 10–30)
AST SERPL-CCNC: 16 U/L (ref 0–31)
BACTERIA URNS QL MICRO: ABNORMAL
BARBITURATES UR QL SCN: NEGATIVE
BASOPHILS # BLD: 0.03 K/UL (ref 0–0.2)
BASOPHILS NFR BLD: 0 % (ref 0–2)
BENZODIAZ UR QL: NEGATIVE
BILIRUB SERPL-MCNC: 0.4 MG/DL (ref 0–1.2)
BILIRUB UR QL STRIP: NEGATIVE
BUN SERPL-MCNC: 12 MG/DL (ref 6–23)
BUPRENORPHINE UR QL: NEGATIVE
CALCIUM SERPL-MCNC: 8.6 MG/DL (ref 8.6–10.2)
CANNABINOIDS UR QL SCN: NEGATIVE
CASTS #/AREA URNS LPF: ABNORMAL /LPF
CHLORIDE SERPL-SCNC: 103 MMOL/L (ref 98–107)
CLARITY UR: CLEAR
CO2 SERPL-SCNC: 25 MMOL/L (ref 22–29)
COCAINE UR QL SCN: NEGATIVE
COLOR UR: YELLOW
CREAT SERPL-MCNC: 0.7 MG/DL (ref 0.5–1)
EOSINOPHIL # BLD: 0.03 K/UL (ref 0.05–0.5)
EOSINOPHILS RELATIVE PERCENT: 0 % (ref 0–6)
EPI CELLS #/AREA URNS HPF: ABNORMAL /HPF
ERYTHROCYTE [DISTWIDTH] IN BLOOD BY AUTOMATED COUNT: 14.3 % (ref 11.5–15)
ETHANOLAMINE SERPL-MCNC: <10 MG/DL (ref 0–0.08)
FENTANYL UR QL: NEGATIVE
GFR, ESTIMATED: >90 ML/MIN/1.73M2
GLUCOSE SERPL-MCNC: 115 MG/DL (ref 74–99)
GLUCOSE UR STRIP-MCNC: NEGATIVE MG/DL
HCT VFR BLD AUTO: 38.7 % (ref 34–48)
HGB BLD-MCNC: 12.7 G/DL (ref 11.5–15.5)
HGB UR QL STRIP.AUTO: ABNORMAL
IMM GRANULOCYTES # BLD AUTO: 0.05 K/UL (ref 0–0.58)
IMM GRANULOCYTES NFR BLD: 1 % (ref 0–5)
KETONES UR STRIP-MCNC: NEGATIVE MG/DL
LACTATE BLDV-SCNC: 1.3 MMOL/L (ref 0.5–2.2)
LEUKOCYTE ESTERASE UR QL STRIP: NEGATIVE
LYMPHOCYTES NFR BLD: 2.05 K/UL (ref 1.5–4)
LYMPHOCYTES RELATIVE PERCENT: 19 % (ref 20–42)
MCH RBC QN AUTO: 28.1 PG (ref 26–35)
MCHC RBC AUTO-ENTMCNC: 32.8 G/DL (ref 32–34.5)
MCV RBC AUTO: 85.6 FL (ref 80–99.9)
METHADONE UR QL: NEGATIVE
MONOCYTES NFR BLD: 0.58 K/UL (ref 0.1–0.95)
MONOCYTES NFR BLD: 6 % (ref 2–12)
MUCOUS THREADS URNS QL MICRO: PRESENT
NEUTROPHILS NFR BLD: 74 % (ref 43–80)
NEUTS SEG NFR BLD: 7.88 K/UL (ref 1.8–7.3)
NITRITE UR QL STRIP: NEGATIVE
O2 DELIVERY DEVICE: ABNORMAL
OPIATES UR QL SCN: NEGATIVE
OXYCODONE UR QL SCN: NEGATIVE
PCP UR QL SCN: NEGATIVE
PH UR STRIP: 7 [PH] (ref 5–8)
PLATELET # BLD AUTO: 224 K/UL (ref 130–450)
PMV BLD AUTO: 10.4 FL (ref 7–12)
POC HCO3: 25.7 MMOL/L (ref 22–26)
POC O2 SATURATION: 95.8 % (ref 92–98.5)
POC PCO2: 34.2 MM HG (ref 35–45)
POC PH: 7.48 (ref 7.35–7.45)
POC PO2: 73.7 MM HG (ref 60–80)
POSITIVE BASE EXCESS, ART: 2.5 MMOL/L (ref 0–3)
POTASSIUM SERPL-SCNC: 3.3 MMOL/L (ref 3.5–5)
PROT SERPL-MCNC: 6.9 G/DL (ref 6.4–8.3)
PROT UR STRIP-MCNC: 30 MG/DL
RBC # BLD AUTO: 4.52 M/UL (ref 3.5–5.5)
RBC #/AREA URNS HPF: ABNORMAL /HPF
SALICYLATES SERPL-MCNC: <0.3 MG/DL (ref 0–30)
SAMPLE SITE: ABNORMAL
SODIUM SERPL-SCNC: 140 MMOL/L (ref 132–146)
SP GR UR STRIP: 1.01 (ref 1–1.03)
TEST INFORMATION: NORMAL
TOXIC TRICYCLIC SC,BLOOD: NEGATIVE
TROPONIN I SERPL HS-MCNC: 13 NG/L (ref 0–14)
TROPONIN I SERPL HS-MCNC: 13 NG/L (ref 0–14)
TROPONIN I SERPL HS-MCNC: 14 NG/L (ref 0–14)
TSH SERPL DL<=0.05 MIU/L-ACNC: 0.12 UIU/ML (ref 0.27–4.2)
UROBILINOGEN UR STRIP-ACNC: 1 EU/DL (ref 0–1)
WBC #/AREA URNS HPF: ABNORMAL /HPF
WBC OTHER # BLD: 10.6 K/UL (ref 4.5–11.5)

## 2025-05-11 PROCEDURE — 6360000002 HC RX W HCPCS: Performed by: INTERNAL MEDICINE

## 2025-05-11 PROCEDURE — 1200000000 HC SEMI PRIVATE

## 2025-05-11 PROCEDURE — 80179 DRUG ASSAY SALICYLATE: CPT

## 2025-05-11 PROCEDURE — 6360000002 HC RX W HCPCS: Performed by: EMERGENCY MEDICINE

## 2025-05-11 PROCEDURE — 2500000003 HC RX 250 WO HCPCS: Performed by: INTERNAL MEDICINE

## 2025-05-11 PROCEDURE — 87086 URINE CULTURE/COLONY COUNT: CPT

## 2025-05-11 PROCEDURE — 93005 ELECTROCARDIOGRAM TRACING: CPT | Performed by: EMERGENCY MEDICINE

## 2025-05-11 PROCEDURE — 82140 ASSAY OF AMMONIA: CPT

## 2025-05-11 PROCEDURE — 6360000004 HC RX CONTRAST MEDICATION: Performed by: RADIOLOGY

## 2025-05-11 PROCEDURE — 80053 COMPREHEN METABOLIC PANEL: CPT

## 2025-05-11 PROCEDURE — 85025 COMPLETE CBC W/AUTO DIFF WBC: CPT

## 2025-05-11 PROCEDURE — 6370000000 HC RX 637 (ALT 250 FOR IP): Performed by: INTERNAL MEDICINE

## 2025-05-11 PROCEDURE — 80307 DRUG TEST PRSMV CHEM ANLYZR: CPT

## 2025-05-11 PROCEDURE — 2580000003 HC RX 258: Performed by: EMERGENCY MEDICINE

## 2025-05-11 PROCEDURE — 70498 CT ANGIOGRAPHY NECK: CPT

## 2025-05-11 PROCEDURE — 99285 EMERGENCY DEPT VISIT HI MDM: CPT

## 2025-05-11 PROCEDURE — 84484 ASSAY OF TROPONIN QUANT: CPT

## 2025-05-11 PROCEDURE — 82803 BLOOD GASES ANY COMBINATION: CPT

## 2025-05-11 PROCEDURE — 87040 BLOOD CULTURE FOR BACTERIA: CPT

## 2025-05-11 PROCEDURE — 71045 X-RAY EXAM CHEST 1 VIEW: CPT

## 2025-05-11 PROCEDURE — G0480 DRUG TEST DEF 1-7 CLASSES: HCPCS

## 2025-05-11 PROCEDURE — 96360 HYDRATION IV INFUSION INIT: CPT

## 2025-05-11 PROCEDURE — 81001 URINALYSIS AUTO W/SCOPE: CPT

## 2025-05-11 PROCEDURE — 70450 CT HEAD/BRAIN W/O DYE: CPT

## 2025-05-11 PROCEDURE — 70551 MRI BRAIN STEM W/O DYE: CPT

## 2025-05-11 PROCEDURE — 70496 CT ANGIOGRAPHY HEAD: CPT

## 2025-05-11 PROCEDURE — 96361 HYDRATE IV INFUSION ADD-ON: CPT

## 2025-05-11 PROCEDURE — 84443 ASSAY THYROID STIM HORMONE: CPT

## 2025-05-11 PROCEDURE — 80143 DRUG ASSAY ACETAMINOPHEN: CPT

## 2025-05-11 PROCEDURE — 83605 ASSAY OF LACTIC ACID: CPT

## 2025-05-11 RX ORDER — 0.9 % SODIUM CHLORIDE 0.9 %
1000 INTRAVENOUS SOLUTION INTRAVENOUS ONCE
Status: COMPLETED | OUTPATIENT
Start: 2025-05-11 | End: 2025-05-11

## 2025-05-11 RX ORDER — IOPAMIDOL 755 MG/ML
75 INJECTION, SOLUTION INTRAVASCULAR
Status: COMPLETED | OUTPATIENT
Start: 2025-05-11 | End: 2025-05-11

## 2025-05-11 RX ORDER — SODIUM CHLORIDE AND POTASSIUM CHLORIDE 300; 900 MG/100ML; MG/100ML
INJECTION, SOLUTION INTRAVENOUS CONTINUOUS
Status: DISCONTINUED | OUTPATIENT
Start: 2025-05-11 | End: 2025-05-12 | Stop reason: HOSPADM

## 2025-05-11 RX ORDER — ENOXAPARIN SODIUM 100 MG/ML
40 INJECTION SUBCUTANEOUS DAILY
Status: DISCONTINUED | OUTPATIENT
Start: 2025-05-11 | End: 2025-05-12 | Stop reason: HOSPADM

## 2025-05-11 RX ORDER — MAGNESIUM SULFATE IN WATER 40 MG/ML
2000 INJECTION, SOLUTION INTRAVENOUS PRN
Status: DISCONTINUED | OUTPATIENT
Start: 2025-05-11 | End: 2025-05-12 | Stop reason: HOSPADM

## 2025-05-11 RX ORDER — DEXAMETHASONE SODIUM PHOSPHATE 4 MG/ML
4 INJECTION, SOLUTION INTRA-ARTICULAR; INTRALESIONAL; INTRAMUSCULAR; INTRAVENOUS; SOFT TISSUE EVERY 12 HOURS
Status: DISCONTINUED | OUTPATIENT
Start: 2025-05-11 | End: 2025-05-12 | Stop reason: HOSPADM

## 2025-05-11 RX ORDER — POTASSIUM CHLORIDE 1500 MG/1
40 TABLET, EXTENDED RELEASE ORAL PRN
Status: DISCONTINUED | OUTPATIENT
Start: 2025-05-11 | End: 2025-05-12 | Stop reason: HOSPADM

## 2025-05-11 RX ORDER — ASPIRIN 81 MG/1
81 TABLET, CHEWABLE ORAL DAILY
Status: DISCONTINUED | OUTPATIENT
Start: 2025-05-11 | End: 2025-05-12 | Stop reason: HOSPADM

## 2025-05-11 RX ORDER — ALBUTEROL SULFATE 0.83 MG/ML
2.5 SOLUTION RESPIRATORY (INHALATION) EVERY 6 HOURS PRN
Status: DISCONTINUED | OUTPATIENT
Start: 2025-05-11 | End: 2025-05-12 | Stop reason: HOSPADM

## 2025-05-11 RX ORDER — LEVOTHYROXINE SODIUM 50 UG/1
50 TABLET ORAL DAILY
Status: DISCONTINUED | OUTPATIENT
Start: 2025-05-12 | End: 2025-05-12 | Stop reason: HOSPADM

## 2025-05-11 RX ORDER — CLOPIDOGREL BISULFATE 75 MG/1
75 TABLET ORAL DAILY
Status: DISCONTINUED | OUTPATIENT
Start: 2025-05-11 | End: 2025-05-12 | Stop reason: HOSPADM

## 2025-05-11 RX ORDER — SODIUM CHLORIDE 0.9 % (FLUSH) 0.9 %
SYRINGE (ML) INJECTION
Status: DISCONTINUED
Start: 2025-05-11 | End: 2025-05-11

## 2025-05-11 RX ORDER — LEVETIRACETAM 500 MG/1
500 TABLET ORAL 2 TIMES DAILY
Status: DISCONTINUED | OUTPATIENT
Start: 2025-05-11 | End: 2025-05-12 | Stop reason: HOSPADM

## 2025-05-11 RX ORDER — ONDANSETRON 2 MG/ML
4 INJECTION INTRAMUSCULAR; INTRAVENOUS EVERY 6 HOURS PRN
Status: DISCONTINUED | OUTPATIENT
Start: 2025-05-11 | End: 2025-05-12 | Stop reason: HOSPADM

## 2025-05-11 RX ORDER — POTASSIUM CHLORIDE 7.45 MG/ML
10 INJECTION INTRAVENOUS PRN
Status: DISCONTINUED | OUTPATIENT
Start: 2025-05-11 | End: 2025-05-12 | Stop reason: HOSPADM

## 2025-05-11 RX ORDER — METOPROLOL TARTRATE 25 MG/1
25 TABLET, FILM COATED ORAL 2 TIMES DAILY
Status: DISCONTINUED | OUTPATIENT
Start: 2025-05-11 | End: 2025-05-12 | Stop reason: HOSPADM

## 2025-05-11 RX ORDER — LISINOPRIL 10 MG/1
10 TABLET ORAL DAILY
Status: DISCONTINUED | OUTPATIENT
Start: 2025-05-11 | End: 2025-05-12 | Stop reason: HOSPADM

## 2025-05-11 RX ORDER — ATORVASTATIN CALCIUM 40 MG/1
40 TABLET, FILM COATED ORAL NIGHTLY
Status: DISCONTINUED | OUTPATIENT
Start: 2025-05-11 | End: 2025-05-12 | Stop reason: HOSPADM

## 2025-05-11 RX ADMIN — LEVETIRACETAM 500 MG: 500 TABLET, FILM COATED ORAL at 21:14

## 2025-05-11 RX ADMIN — DEXAMETHASONE SODIUM PHOSPHATE 4 MG: 4 INJECTION INTRA-ARTICULAR; INTRALESIONAL; INTRAMUSCULAR; INTRAVENOUS; SOFT TISSUE at 21:14

## 2025-05-11 RX ADMIN — POTASSIUM CHLORIDE 40 MEQ: 1500 TABLET, EXTENDED RELEASE ORAL at 20:17

## 2025-05-11 RX ADMIN — ASPIRIN 81 MG CHEWABLE TABLET 81 MG: 81 TABLET CHEWABLE at 20:16

## 2025-05-11 RX ADMIN — LISINOPRIL 10 MG: 10 TABLET ORAL at 20:17

## 2025-05-11 RX ADMIN — METOPROLOL TARTRATE 25 MG: 25 TABLET, FILM COATED ORAL at 20:17

## 2025-05-11 RX ADMIN — PROTAMINE SULFATE 40 MG: 10 INJECTION, SOLUTION INTRAVENOUS at 21:06

## 2025-05-11 RX ADMIN — ENOXAPARIN SODIUM 40 MG: 40 INJECTION SUBCUTANEOUS at 20:27

## 2025-05-11 RX ADMIN — SODIUM CHLORIDE 1000 ML: 0.9 INJECTION, SOLUTION INTRAVENOUS at 13:02

## 2025-05-11 RX ADMIN — ATORVASTATIN CALCIUM 40 MG: 40 TABLET, FILM COATED ORAL at 20:17

## 2025-05-11 RX ADMIN — SODIUM CHLORIDE 1000 ML: 0.9 INJECTION, SOLUTION INTRAVENOUS at 13:04

## 2025-05-11 RX ADMIN — POTASSIUM CHLORIDE AND SODIUM CHLORIDE: 900; 300 INJECTION, SOLUTION INTRAVENOUS at 21:25

## 2025-05-11 RX ADMIN — IOPAMIDOL 75 ML: 755 INJECTION, SOLUTION INTRAVENOUS at 14:19

## 2025-05-11 RX ADMIN — WATER 1000 MG: 1 INJECTION INTRAMUSCULAR; INTRAVENOUS; SUBCUTANEOUS at 20:18

## 2025-05-11 ASSESSMENT — LIFESTYLE VARIABLES
HOW OFTEN DO YOU HAVE A DRINK CONTAINING ALCOHOL: NEVER
HOW MANY STANDARD DRINKS CONTAINING ALCOHOL DO YOU HAVE ON A TYPICAL DAY: PATIENT DOES NOT DRINK

## 2025-05-11 NOTE — ED PROVIDER NOTES
Patient presents emergency department via EMS.  Her  for home health care called EMS when they felt she was acting strangely.  Patient reports having confusion and a headache for the past 4 days.  She has previous history of stroke but fully recovered from her previous symptoms.  She states that she takes medications for thyroid and high blood pressure and she has been taking it.  Per EMS when they picked her up they said she was sitting in her car and appeared to be confused.  They felt she was having trouble focusing her vision and was very slow to answer questions.  They did not have concern for any drug paraphernalia.  They did report that she was asking for things like her cell phone and her cigarettes and sweatshirt all of which she was holding in her hands.          Review of Systems   Constitutional:  Positive for activity change. Negative for fatigue and fever.   HENT: Negative.     Eyes:  Positive for visual disturbance.   Respiratory:  Positive for shortness of breath (Yesterday).    Cardiovascular:  Negative for chest pain, palpitations and leg swelling.   Gastrointestinal:  Negative for abdominal pain, diarrhea, nausea and vomiting.   Genitourinary:  Negative for dysuria.   Musculoskeletal:  Negative for myalgias and neck pain.   Skin: Negative.    Neurological:  Positive for headaches. Negative for syncope, weakness and light-headedness.   Hematological: Negative.    Psychiatric/Behavioral:  Positive for confusion.        Physical Exam  Vitals and nursing note reviewed.   Constitutional:       General: She is not in acute distress.     Appearance: She is well-developed and normal weight. She is ill-appearing. She is not toxic-appearing or diaphoretic.   HENT:      Head: Normocephalic and atraumatic.      Mouth/Throat:      Comments: Dry mucous membranes  Eyes:      General: No scleral icterus.     Extraocular Movements: Extraocular movements intact.      Pupils: Pupils are equal,  round, and reactive to light.   Cardiovascular:      Rate and Rhythm: Normal rate and regular rhythm.      Heart sounds: Normal heart sounds. No murmur heard.  Pulmonary:      Effort: Pulmonary effort is normal. No respiratory distress.      Breath sounds: Normal breath sounds. No wheezing or rales.   Abdominal:      General: Bowel sounds are normal.      Palpations: Abdomen is soft.      Tenderness: There is no abdominal tenderness. There is no guarding or rebound.   Musculoskeletal:         General: No tenderness. Normal range of motion.      Cervical back: Normal range of motion and neck supple. No rigidity.   Lymphadenopathy:      Cervical: No cervical adenopathy.   Skin:     General: Skin is warm and dry.      Capillary Refill: Capillary refill takes less than 2 seconds.      Coloration: Skin is not pale.      Findings: No rash.   Neurological:      Mental Status: She is alert and oriented to person, place, and time.      GCS: GCS eye subscore is 4. GCS verbal subscore is 5. GCS motor subscore is 6.      Cranial Nerves: No cranial nerve deficit.      Sensory: No sensory deficit.      Motor: No weakness.      Coordination: Coordination normal.      Comments: Patient is oriented x 3.  She is very slow with her responses but answers questions appropriately.   Last known well time: unknown  NIH Stroke Scale at time of initial evaluation: 0  1A: Level of Consciousness 1 - not alert but arousable by minor stimulation to obey, answer or respond  1B: Ask Month and Age 0 - answers both questions correctly  1C: Tell Patient To Open and Close Eyes, then Hand  Squeeze 0 - performs both tasks correctly  2: Test Horizontal Extraocular Movements 0 - normal  3: Test Visual Fields 0 - no visual loss  4: Test Facial Palsy 0 - normal symmetric movement  5A: Test Left Arm Motor Drift 0 - no drift, limb holds 90 (or 45) degrees for full 10 seconds  5B: Test Right Arm Motor Drift 0 - no drift, limb holds 90 (or 45) degrees for  included: a personal history and physicial examination, multiple bedside re-evaluations, cardiac monitoring, and continuous pulse oximetry    This patient has remained hemodynamically stable during their ED course.    Please note that the withdrawal or failure to initiate urgent interventions for this patient would likely result in a life threatening deterioration or permanent disability.      Accordingly this patient received 30 minutes of critical care time, excluding separately billable procedures.       Clinical Impression  1. Subacute subdural hematoma (HCC)    2. Altered mental status, unspecified altered mental status type    3. Acute nonintractable headache, unspecified headache type    4. Cerebrovascular accident (CVA), unspecified mechanism (HCC)          Disposition  Patient's disposition: Transfer to [facility] for neuro/neurosurg consult  Patient's condition is stable.          Sherine Tapia DO  05/11/25 2017       Sherine Tapia DO  05/11/25 2050       Sherine Tapia DO  05/11/25 2128

## 2025-05-12 VITALS
SYSTOLIC BLOOD PRESSURE: 147 MMHG | OXYGEN SATURATION: 99 % | HEART RATE: 85 BPM | DIASTOLIC BLOOD PRESSURE: 76 MMHG | TEMPERATURE: 97.7 F | HEIGHT: 62 IN | BODY MASS INDEX: 30.91 KG/M2 | RESPIRATION RATE: 25 BRPM | WEIGHT: 168 LBS

## 2025-05-12 LAB
EKG ATRIAL RATE: 73 BPM
EKG P AXIS: 53 DEGREES
EKG P-R INTERVAL: 132 MS
EKG Q-T INTERVAL: 426 MS
EKG QRS DURATION: 86 MS
EKG QTC CALCULATION (BAZETT): 469 MS
EKG R AXIS: 6 DEGREES
EKG T AXIS: 9 DEGREES
EKG VENTRICULAR RATE: 73 BPM

## 2025-05-12 PROCEDURE — 93010 ELECTROCARDIOGRAM REPORT: CPT | Performed by: INTERNAL MEDICINE

## 2025-05-12 NOTE — PROGRESS NOTES
Patient was boarded here in the emergency department and awaiting transfer.  She has been accepted at [facility], as well as as [facility] as services not supported at this facility.  No beds available at this time.    Call earlier was also sent out to .  I did speak with Dr. Hui neurosurgery who recommended transfer to [facility] ED.  I spoke with Dr. Howard ED attending, he accepted if they cannot accommodate patient on the floor.    I had received a call back from [facility].  There is a [facility] bed available at this time for patient.  We will directly directly admit patient to a bed at [facility].

## 2025-05-13 LAB
MICROORGANISM SPEC CULT: NO GROWTH
SERVICE CMNT-IMP: NORMAL
SPECIMEN DESCRIPTION: NORMAL

## 2025-05-17 LAB
MICROORGANISM SPEC CULT: NORMAL
MICROORGANISM SPEC CULT: NORMAL
SERVICE CMNT-IMP: NORMAL
SERVICE CMNT-IMP: NORMAL
SPECIMEN DESCRIPTION: NORMAL
SPECIMEN DESCRIPTION: NORMAL

## 2025-06-21 DIAGNOSIS — E04.1 THYROID NODULE: Primary | ICD-10-CM

## 2025-07-09 ENCOUNTER — HOSPITAL ENCOUNTER (OUTPATIENT)
Dept: ULTRASOUND IMAGING | Age: 60
Discharge: HOME | End: 2025-07-09
Payer: COMMERCIAL

## 2025-07-09 DIAGNOSIS — E04.1 THYROID NODULE: ICD-10-CM

## 2025-07-09 PROCEDURE — 76536 US EXAM OF HEAD AND NECK: CPT
